# Patient Record
Sex: FEMALE | Race: BLACK OR AFRICAN AMERICAN | Employment: PART TIME | URBAN - METROPOLITAN AREA
[De-identification: names, ages, dates, MRNs, and addresses within clinical notes are randomized per-mention and may not be internally consistent; named-entity substitution may affect disease eponyms.]

---

## 2018-03-16 ENCOUNTER — OFFICE VISIT (OUTPATIENT)
Dept: FAMILY MEDICINE CLINIC | Age: 38
End: 2018-03-16

## 2018-03-16 VITALS
WEIGHT: 270 LBS | HEART RATE: 76 BPM | BODY MASS INDEX: 43.39 KG/M2 | DIASTOLIC BLOOD PRESSURE: 71 MMHG | SYSTOLIC BLOOD PRESSURE: 121 MMHG | OXYGEN SATURATION: 97 % | HEIGHT: 66 IN | TEMPERATURE: 98.6 F | RESPIRATION RATE: 16 BRPM

## 2018-03-16 DIAGNOSIS — F41.9 ANXIETY AND DEPRESSION: Primary | ICD-10-CM

## 2018-03-16 DIAGNOSIS — N91.2 AMENORRHEA: ICD-10-CM

## 2018-03-16 DIAGNOSIS — Z87.898 HISTORY OF PREDIABETES: ICD-10-CM

## 2018-03-16 DIAGNOSIS — F32.A ANXIETY AND DEPRESSION: Primary | ICD-10-CM

## 2018-03-16 DIAGNOSIS — Z00.00 PHYSICAL EXAM, ANNUAL: ICD-10-CM

## 2018-03-16 DIAGNOSIS — Z30.09 ENCOUNTER FOR GENERAL COUNSELING AND ADVICE ON CONTRACEPTIVE MANAGEMENT: ICD-10-CM

## 2018-03-16 PROBLEM — E66.01 OBESITY, MORBID (HCC): Status: ACTIVE | Noted: 2018-03-16

## 2018-03-16 LAB
HBA1C MFR BLD HPLC: 5.9 %
HCG URINE, QL. (POC): NEGATIVE
VALID INTERNAL CONTROL?: YES

## 2018-03-16 RX ORDER — METRONIDAZOLE 500 MG/1
TABLET ORAL
COMMUNITY
Start: 2018-02-05 | End: 2018-03-16 | Stop reason: ALTCHOICE

## 2018-03-16 RX ORDER — NORETHINDRONE 0.35 MG/1
TABLET ORAL
COMMUNITY
Start: 2017-12-21 | End: 2018-03-16

## 2018-03-16 RX ORDER — VENLAFAXINE HYDROCHLORIDE 37.5 MG/1
37.5 CAPSULE, EXTENDED RELEASE ORAL DAILY
Qty: 30 CAP | Refills: 1 | Status: SHIPPED | OUTPATIENT
Start: 2018-03-16 | End: 2018-06-25

## 2018-03-16 NOTE — MR AVS SNAPSHOT
[address] [facility] 
 
 
 [address] [facility] [place] [address] [id number] [phone] Patient: Tip Hawk MRN: [id number] JQT:[id number] Visit Information Date & Time Provider Department Dept. Phone Encounter #  
 3/16/2018 10:30 AM Bhargav Matthew NP [facility] [phone] [id number] Follow-up Instructions Return in about 4 weeks (around 4/13/2018) for CPE and lab/med follow-up. Upcoming Health Maintenance Date Due DTaP/Tdap/Td series (1 - Tdap) 9/4/2001 PAP AKA CERVICAL CYTOLOGY 9/4/2001 Allergies as of 3/16/2018  Review Complete On: 3/16/2018 By: Bhargav Matthew NP No Known Allergies Current Immunizations  Never Reviewed Name Date Influenza Vaccine 10/26/2015 MMR 5/20/1993 Td 7/20/2014 Not reviewed this visit You Were Diagnosed With   
  
 Codes Comments Anxiety and depression    -  Primary ICD-10-CM: F41.8 ICD-9-CM: 300.00, 311 Encounter for general counseling and advice on contraceptive management     ICD-10-CM: Z30.09 
ICD-9-CM: V25.09 Amenorrhea     ICD-10-CM: N91.2 ICD-9-CM: 626.0 History of prediabetes     ICD-10-CM: Z87.898 ICD-9-CM: V12.29 Physical exam, annual     ICD-10-CM: Z00.00 ICD-9-CM: V70.0 Vitals BP Pulse Temp Resp Height(growth percentile) Weight(growth percentile) 121/71 (BP 1 Location: Left arm, BP Patient Position: Sitting) 76 98.6 °F (37 °C) (Oral) 16 5' 6\" (1.676 m) 270 lb (122.5 kg) LMP SpO2 BMI OB Status Smoking Status 02/15/2018 (Exact Date) 97% 43.58 kg/m2 Having regular periods Light Tobacco Smoker BMI and BSA Data Body Mass Index Body Surface Area 43.58 kg/m 2 2.39 m 2 Preferred Pharmacy Pharmacy Name Phone [address] [facility] [address] [phone] Your Updated Medication List  
  
   
 This list is accurate as of 3/16/18 11:41 AM.  Always use your most recent med list.  
  
  
  
  
 venlafaxine-SR 37.5 mg capsule Commonly known as:  EFFEXOR-XR Take 1 Cap by mouth daily. Prescriptions Sent to Pharmacy Refills  
 venlafaxine-SR (EFFEXOR-XR) 37.5 mg capsule 1 Sig: Take 1 Cap by mouth daily. Class: Normal  
 Pharmacy: [facility] [address] [facility], [address] [place] [address] #: [phone] Route: Oral  
  
We Performed the Following AMB POC HEMOGLOBIN A1C [84512 CPT(R)] AMB POC URINE PREGNANCY TEST, VISUAL COLOR COMPARISON [16000 CPT(R)] CBC W/O DIFF [71938 CPT(R)] LIPID PANEL AND CHOL/HDL RATIO [38859 CPT(R)] METABOLIC PANEL, COMPREHENSIVE [10228 CPT(R)] REFERRAL TO GYNECOLOGY [REF30 Custom] REFERRAL TO PSYCHIATRY [REF91 Custom] TSH 3RD GENERATION [97165 CPT(R)] URINALYSIS W/ RFLX MICROSCOPIC [83297 CPT(R)] VITAMIN D, 25 HYDROXY [id number] CPT(R)] Follow-up Instructions Return in about 4 weeks (around 4/13/2018) for CPE and lab/med follow-up. Referral Information Referral ID Referred By Referred To  
  
 [id number], [facility] [address] Phone: [phone] Fax: [phone] Visits Status Start Date End Date 1 New Request 3/16/18 3/16/19 If your referral has a status of pending review or denied, additional information will be sent to support the outcome of this decision. Referral ID Referred By Referred To  
 [id number] Deann Lara MD  
   [address] [facility] [address] [place], [address] [facility] Phone: [phone] Fax: [phone] Visits Status Start Date End Date 1 New Request 3/16/18 3/16/19 If your referral has a status of pending review or denied, additional information will be sent to support the outcome of this decision. Patient Instructions Anxiety Disorder: Care Instructions Your Care Instructions Anxiety is a normal reaction to stress. Difficult situations can cause you to have symptoms such as sweaty palms and a nervous feeling. In an anxiety disorder, the symptoms are far more severe. Constant worry, muscle tension, trouble sleeping, nausea and diarrhea, and other symptoms can make normal daily activities difficult or impossible. These symptoms may occur for no reason, and they can affect your work, school, or social life. Medicines, counseling, and self-care can all help. Follow-up care is a key part of your treatment and safety. Be sure to make and go to all appointments, and call your doctor if you are having problems. It's also a good idea to know your test results and keep a list of the medicines you take. How can you care for yourself at home? · Take medicines exactly as directed. Call your doctor if you think you are having a problem with your medicine. · Go to your counseling sessions and follow-up appointments. · Recognize and accept your anxiety. Then, when you are in a situation that makes you anxious, say to yourself, \"This is not an emergency. I feel uncomfortable, but I am not in danger. I can keep going even if I feel anxious. \" · Be kind to your body: ¨ Relieve tension with exercise or a massage. ¨ Get enough rest. 
¨ Avoid alcohol, caffeine, nicotine, and illegal drugs. They can increase your anxiety level and cause sleep problems. ¨ Learn and do relaxation techniques. See below for more about these techniques. · Engage your mind. Get out and do something you enjoy. Go to a funny movie, or take a walk or hike. Plan your day. Having too much or too little to do can make you anxious. · Keep a record of your symptoms. Discuss your fears with a good friend or family member, or join a support group for people with similar problems. Talking to others sometimes relieves stress. · Get involved in social groups, or volunteer to help others. Being alone sometimes makes things seem worse than they are. · Get at least 30 minutes of exercise on most days of the week to relieve stress. Walking is a good choice. You also may want to do other activities, such as running, swimming, cycling, or playing tennis or team sports. Relaxation techniques Do relaxation exercises 10 to 20 minutes a day. You can play soothing, relaxing music while you do them, if you wish. · Tell others in your house that you are going to do your relaxation exercises. Ask them not to disturb you. · Find a comfortable place, away from all distractions and noise. · Lie down on your back, or sit with your back straight. · Focus on your breathing. Make it slow and steady. · Breathe in through your nose. Breathe out through either your nose or mouth. · Breathe deeply, filling up the area between your navel and your rib cage. Breathe so that your belly goes up and down. · Do not hold your breath. · Breathe like this for 5 to 10 minutes. Notice the feeling of calmness throughout your whole body. As you continue to breathe slowly and deeply, relax by doing the following for another 5 to 10 minutes: · Tighten and relax each muscle group in your body. You can begin at your toes and work your way up to your head. · Imagine your muscle groups relaxing and becoming heavy. · Empty your mind of all thoughts. · Let yourself relax more and more deeply. · Become aware of the state of calmness that surrounds you. · When your relaxation time is over, you can bring yourself back to alertness by moving your fingers and toes and then your hands and feet and then stretching and moving your entire body. Sometimes people fall asleep during relaxation, but they usually wake up shortly afterward.  
· Always give yourself time to return to full alertness before you drive a car or do anything that might cause an accident if you are not fully alert. Never play a relaxation tape while you drive a car. When should you call for help? Call 911 anytime you think you may need emergency care. For example, call if: 
? · You feel you cannot stop from hurting yourself or someone else. ? Keep the numbers for these national suicide hotlines: 1-860-884-TALK (7-920.774.3797) and 4-969-RMQVTMR (2-221.172.2135). If you or someone you know talks about suicide or feeling hopeless, get help right away. ? Watch closely for changes in your health, and be sure to contact your doctor if: 
? · You have anxiety or fear that affects your life. ? · You have symptoms of anxiety that are new or different from those you had before. Where can you learn more? Go to http://daniel-winston.info/. Enter P754 in the search box to learn more about \"Anxiety Disorder: Care Instructions. \" Current as of: May 12, 2017 Content Version: 11.4 © 3875-1395 [employer]. Care instructions adapted under license by [employer] (which disclaims liability or warranty for this information). If you have questions about a medical condition or this instruction, always ask your healthcare professional. [address] any warranty or liability for your use of this information. Introducing [facility]! [facility] introduces TelePharm patient portal. Now you can access parts of your medical record, email your doctor's office, and request medication refills online. 1. In your internet browser, go to [email or web address] 2. Click on the First Time User? Click Here link in the Sign In box. You will see the New Member Sign Up page. 3. Enter your TelePharm Access Code exactly as it appears below. You will not need to use this code after youve completed the sign-up process.  If you do not sign up before the expiration date, you must request a new code. 
 
· [employer] Access Code: [email or web address] Expires: 6/14/2018 11:41 AM 
 
4. Enter the last four digits of your Social Security Number (xxxx) and Date of Birth (mm/dd/yyyy) as indicated and click Submit. You will be taken to the next sign-up page. 5. Create a NanoRacks ID. This will be your NanoRacks login ID and cannot be changed, so think of one that is secure and easy to remember. 6. Create a [employer]Racks password. You can change your password at any time. 7. Enter your Password Reset Question and Answer. This can be used at a later time if you forget your password. 8. Enter your e-mail address. You will receive e-mail notification when new information is available in [address] 9. Click Sign Up. You can now view and download portions of your medical record. 10. Click the Download Summary menu link to download a portable copy of your medical information. If you have questions, please visit the Frequently Asked Questions section of the [employer] website. Remember, [employer] is NOT to be used for urgent needs. For medical emergencies, dial 911. Now available from your iPhone and Android! Please provide this summary of care documentation to your next provider. Your primary care clinician is listed as Stephania Wagner. If you have any questions after today's visit, please call [phone].

## 2018-03-16 NOTE — PATIENT INSTRUCTIONS
Anxiety Disorder: Care Instructions  Your Care Instructions    Anxiety is a normal reaction to stress. Difficult situations can cause you to have symptoms such as sweaty palms and a nervous feeling. In an anxiety disorder, the symptoms are far more severe. Constant worry, muscle tension, trouble sleeping, nausea and diarrhea, and other symptoms can make normal daily activities difficult or impossible. These symptoms may occur for no reason, and they can affect your work, school, or social life. Medicines, counseling, and self-care can all help. Follow-up care is a key part of your treatment and safety. Be sure to make and go to all appointments, and call your doctor if you are having problems. It's also a good idea to know your test results and keep a list of the medicines you take. How can you care for yourself at home? · Take medicines exactly as directed. Call your doctor if you think you are having a problem with your medicine. · Go to your counseling sessions and follow-up appointments. · Recognize and accept your anxiety. Then, when you are in a situation that makes you anxious, say to yourself, \"This is not an emergency. I feel uncomfortable, but I am not in danger. I can keep going even if I feel anxious. \"  · Be kind to your body:  ¨ Relieve tension with exercise or a massage. ¨ Get enough rest.  ¨ Avoid alcohol, caffeine, nicotine, and illegal drugs. They can increase your anxiety level and cause sleep problems. ¨ Learn and do relaxation techniques. See below for more about these techniques. · Engage your mind. Get out and do something you enjoy. Go to a funny movie, or take a walk or hike. Plan your day. Having too much or too little to do can make you anxious. · Keep a record of your symptoms. Discuss your fears with a good friend or family member, or join a support group for people with similar problems. Talking to others sometimes relieves stress.   · Get involved in social groups, or volunteer to help others. Being alone sometimes makes things seem worse than they are. · Get at least 30 minutes of exercise on most days of the week to relieve stress. Walking is a good choice. You also may want to do other activities, such as running, swimming, cycling, or playing tennis or team sports. Relaxation techniques  Do relaxation exercises 10 to 20 minutes a day. You can play soothing, relaxing music while you do them, if you wish. · Tell others in your house that you are going to do your relaxation exercises. Ask them not to disturb you. · Find a comfortable place, away from all distractions and noise. · Lie down on your back, or sit with your back straight. · Focus on your breathing. Make it slow and steady. · Breathe in through your nose. Breathe out through either your nose or mouth. · Breathe deeply, filling up the area between your navel and your rib cage. Breathe so that your belly goes up and down. · Do not hold your breath. · Breathe like this for 5 to 10 minutes. Notice the feeling of calmness throughout your whole body. As you continue to breathe slowly and deeply, relax by doing the following for another 5 to 10 minutes:  · Tighten and relax each muscle group in your body. You can begin at your toes and work your way up to your head. · Imagine your muscle groups relaxing and becoming heavy. · Empty your mind of all thoughts. · Let yourself relax more and more deeply. · Become aware of the state of calmness that surrounds you. · When your relaxation time is over, you can bring yourself back to alertness by moving your fingers and toes and then your hands and feet and then stretching and moving your entire body. Sometimes people fall asleep during relaxation, but they usually wake up shortly afterward. · Always give yourself time to return to full alertness before you drive a car or do anything that might cause an accident if you are not fully alert.  Never play a relaxation tape while you drive a car. When should you call for help? Call 911 anytime you think you may need emergency care. For example, call if:  ? · You feel you cannot stop from hurting yourself or someone else. ? Keep the numbers for these national suicide hotlines: 3-827-180-TALK (3-291.758.4272) and 1-238-QJWGQXK (2-898.787.4322). If you or someone you know talks about suicide or feeling hopeless, get help right away. ? Watch closely for changes in your health, and be sure to contact your doctor if:  ? · You have anxiety or fear that affects your life. ? · You have symptoms of anxiety that are new or different from those you had before. Where can you learn more? Go to http://daniel-winston.info/. Enter P754 in the search box to learn more about \"Anxiety Disorder: Care Instructions. \"  Current as of: May 12, 2017  Content Version: 11.4  © 2820-6386 [employer]wise[employer]. Care instructions adapted under license by [employer] (which disclaims liability or warranty for this information). If you have questions about a medical condition or this instruction, always ask your healthcare professional. [address] any warranty or liability for your use of this information.

## 2018-03-16 NOTE — PROGRESS NOTES
HISTORY OF PRESENT ILLNESS  eRe Bonilla is a 40 y.o. female. HPI  The patient presents today to establish care. Previous PCP was unsure. Last office visit many years ago. Moved here from [place] recently; working on her diploma and wants to go back to school to be a nurse. Has 4 girls, ranging in age from 5 - 14. Medical Problems:  1. Hx borderline diabetes - Was on metformin in the past for this (2016). Stopped taking it. Has not been rechecked for this. 2. Has hx of anxiety/depression, \"and perhaps bipolar disorder? \" Was on medication (thinks it was zoloft), last about many months ago. Has in-home counseling almost daily through [address] [place]. Would like to get to a psychiatrist for further evaluation; saw one in the past. Denies SI/HI. PHQ-9 score of 5 today and KIKO score of 8. Unable to identify triggers for panic attacks/depression. Not exercising. Current Specialists:  1. OB/GYN - [facility]; last visit Feb (?) 2018    Preventative Care  Flu shot: declines  TDaP: 2014, per patient report  Pap smear: Jan/Feb 2018 done with OB/GYN, unsure who  Mammogram: has never had  Colonoscopy: has never had  Denies family hx of early breast or colon CA. Healthy Habits  Patient is exercising 0x per week; just joined the [facility]. Patient endorses poor diet; drinks juices and eats a fair amount of fast food. Smokes 1/4 pack cigarettes daily, down from 1/2 pack daily. Social alcohol use. Denies illicit drug use. Denies hx of IVDU. Sexually active with 4 male partners in last 12 months. Uses nothing for contraception; interested in pregnancy perhaps, though she isn't sure. LMP 2/15/18. Denies concerns for STIs today; last check 1 month ago. Past Medical History:   Diagnosis Date    Depression      History reviewed. No pertinent surgical history.   Family History   Problem Relation Age of Onset    Diabetes Mother     Hypertension Mother     Diabetes Father    [facility] Hypertension Father      Social History     Social History    Marital status: UNKNOWN     Spouse name: N/A    Number of children: N/A    Years of education: N/A     Social History Main Topics    Smoking status: Light Tobacco Smoker     Packs/day: 0.25    Smokeless tobacco: Never Used    Alcohol use Yes      Comment: social    Drug use: No    Sexual activity: Yes     Partners: Male     Birth control/ protection: None     Other Topics Concern    None     Social History Narrative    None     Patient Active Problem List   Diagnosis Code    Ob[facility] morbid ([facility] E66.01    Anxiety and depression F41.8    History of prediabetes Z87.898     Outpatient Encounter Prescriptions as of 3/16/2018   Medication Sig Dispense Refill    venlafaxine-SR (EFFEXOR-XR) 37.5 mg capsule Take 1 Cap by mouth daily. 30 Cap 1    [DISCONTINUED] metroNIDAZOLE (FLAGYL) 500 mg tablet       [DISCONTINUED] RAIN 0.35 mg tab        No facility-administered encounter medications on file as of 3/16/2018. Visit Vitals    /71 (BP 1 Location: Left arm, BP Patient Position: Sitting)    Pulse 76    Temp 98.6 °F (37 °C) (Oral)    Resp 16    Ht 5' 6\" (1.676 m)    Wt 270 lb (122.5 kg)    LMP 02/15/2018 (Exact Date)    SpO2 97%    BMI 43.58 kg/m2         Review of Systems   Constitutional: Negative for chills, fever and malaise/fatigue. HENT: Negative for congestion and sore throat. Eyes: Negative for blurred vision and double vision. Respiratory: Negative for cough and shortness of breath. Cardiovascular: Negative for chest pain, palpitations, orthopnea and leg swelling. Neurological: Negative for dizziness and headaches. Psychiatric/Behavioral: Positive for depression. Negative for hallucinations, substance abuse and suicidal ideas. The patient is nervous/anxious. The patient does not have insomnia. Physical Exam   Constitutional: She is oriented to person, place, and time.  She appears well-developed and well-nourished. No distress. Morbidly obese   HENT:   Head: Normocephalic and atraumatic. Nose: Nose normal.   Mouth/Throat: Oropharynx is clear and moist. No oropharyngeal exudate. Eyes: Pupils are equal, round, and reactive to light. Right eye exhibits no discharge. Left eye exhibits no discharge. Cardiovascular: Normal rate, regular rhythm and normal heart sounds. Pulmonary/Chest: Effort normal and breath sounds normal. No respiratory distress. She has no wheezes. Neurological: She is alert and oriented to person, place, and time. Skin: Skin is warm and dry. She is not diaphoretic. Psychiatric: She has a normal mood and affect. Her behavior is normal. Judgment normal.   Nursing note and vitals reviewed. ASSESSMENT and PLAN    ICD-10-CM ICD-9-CM    1. Anxiety and depression F41.8 300.00 venlafaxine-SR (EFFEXOR-XR) 37.5 mg capsule     311 REFERRAL TO PSYCHIATRY   2. Encounter for general counseling and advice on contraceptive management Z30.09 V25.09 REFERRAL TO GYNECOLOGY   3. Amenorrhea N91.2 626.0 AMB POC URINE PREGNANCY TEST, VISUAL COLOR COMPARISON   4. History of prediabetes Z87.898 V12.29 AMB POC HEMOGLOBIN A1C   5. Physical exam, annual Z00.00 V70.0 LIPID PANEL AND CHOL/HDL RATIO      METABOLIC PANEL, COMPREHENSIVE      TSH 3RD GENERATION      VITAMIN D, 25 HYDROXY      URINALYSIS W/ RFLX MICROSCOPIC      CBC W/O DIFF     The patient's medications, allergies, and history were all updated today. The patient has signed a records release to request records from  OB/GYN. Discussed contraceptive strategies; patient interested in IUD. Referral placed to Dr. Renae Og for evaluation/placement. Encouraged safe sex practices in the meantime (condom use/limiting # of partners). Start effexor 37.5mg once daily for anxiety/depression.  Patient interested in medication for anxiety, but I discussed that without specific triggers, it will be better to start a once-a-day medication to prevent generalized anxiety. Also discussed that if she does have bipolar disorder, the effexor may make it worse; she would need to seek immediate care if that develops, and she agrees. Referral placed to psychiatry for further evaluation, and patient advised to schedule this appt as soon as possible. Encouraged dietary/exercise changes for weight loss. Encouraged total smoking cessation. Will check basic labs today and have patient return in 4 weeks for CPE and lab/med f/u. Follow-up Disposition:  Return in about 4 weeks (around 4/13/2018) for CPE and lab/med follow-up.

## 2018-03-16 NOTE — PROGRESS NOTES
Chief Complaint   Patient presents with   [facility] Establish Care     Patient here to establish care.   Previous PCP: unknown at  unknown  Patient sees the following specialist  Diego signed by patient today yes  Patient Concerns 1) borderline diabetic  2) meds for anxiety

## 2018-03-17 LAB
25(OH)D3+25(OH)D2 SERPL-MCNC: 12 NG/ML (ref 30–100)
ALBUMIN SERPL-MCNC: 4.3 G/DL (ref 3.5–5.5)
ALBUMIN/GLOB SERPL: 1.4 {RATIO} (ref 1.2–2.2)
ALP SERPL-CCNC: 77 IU/L (ref 39–117)
ALT SERPL-CCNC: 14 IU/L (ref 0–32)
APPEARANCE UR: ABNORMAL
AST SERPL-CCNC: 14 IU/L (ref 0–40)
BILIRUB SERPL-MCNC: 0.3 MG/DL (ref 0–1.2)
BILIRUB UR QL STRIP: NEGATIVE
BUN SERPL-MCNC: 11 MG/DL (ref 6–20)
BUN/CREAT SERPL: 16 (ref 9–23)
CALCIUM SERPL-MCNC: 9.1 MG/DL (ref 8.7–10.2)
CHLORIDE SERPL-SCNC: 101 MMOL/L (ref 96–106)
CHOLEST SERPL-MCNC: 197 MG/DL (ref 100–199)
CHOLEST/HDLC SERPL: 3.5 RATIO UNITS (ref 0–4.4)
CO2 SERPL-SCNC: 22 MMOL/L (ref 18–29)
COLOR UR: YELLOW
CREAT SERPL-MCNC: 0.7 MG/DL (ref 0.57–1)
ERYTHROCYTE [DISTWIDTH] IN BLOOD BY AUTOMATED COUNT: 15.7 % (ref 12.3–15.4)
GFR SERPLBLD CREATININE-BSD FMLA CKD-EPI: 111 ML/MIN/1.73
GFR SERPLBLD CREATININE-BSD FMLA CKD-EPI: 128 ML/MIN/1.73
GLOBULIN SER CALC-MCNC: 3 G/DL (ref 1.5–4.5)
GLUCOSE SERPL-MCNC: 114 MG/DL (ref 65–99)
GLUCOSE UR QL: NEGATIVE
HCT VFR BLD AUTO: 36.5 % (ref 34–46.6)
HDLC SERPL-MCNC: 56 MG/DL
HGB BLD-MCNC: 11.4 G/DL (ref 11.1–15.9)
HGB UR QL STRIP: NEGATIVE
INTERPRETATION, 910389: NORMAL
KETONES UR QL STRIP: NEGATIVE
LDLC SERPL CALC-MCNC: 126 MG/DL (ref 0–99)
LEUKOCYTE ESTERASE UR QL STRIP: NEGATIVE
MCH RBC QN AUTO: 23.3 PG (ref 26.6–33)
MCHC RBC AUTO-ENTMCNC: 31.2 G/DL (ref 31.5–35.7)
MCV RBC AUTO: 75 FL (ref 79–97)
MICRO URNS: ABNORMAL
NITRITE UR QL STRIP: NEGATIVE
PH UR STRIP: 5 [PH] (ref 5–7.5)
PLATELET # BLD AUTO: 316 X10E3/UL (ref 150–379)
POTASSIUM SERPL-SCNC: 4.4 MMOL/L (ref 3.5–5.2)
PROT SERPL-MCNC: 7.3 G/DL (ref 6–8.5)
PROT UR QL STRIP: NEGATIVE
RBC # BLD AUTO: 4.89 X10E6/UL (ref 3.77–5.28)
SODIUM SERPL-SCNC: 139 MMOL/L (ref 134–144)
SP GR UR: 1.02 (ref 1–1.03)
TRIGL SERPL-MCNC: 76 MG/DL (ref 0–149)
TSH SERPL DL<=0.005 MIU/L-ACNC: 1.14 UIU/ML (ref 0.45–4.5)
UROBILINOGEN UR STRIP-MCNC: 0.2 MG/DL (ref 0.2–1)
VLDLC SERPL CALC-MCNC: 15 MG/DL (ref 5–40)
WBC # BLD AUTO: 4.2 X10E3/UL (ref 3.4–10.8)

## 2018-03-26 ENCOUNTER — TELEPHONE (OUTPATIENT)
Dept: FAMILY MEDICINE CLINIC | Age: 38
End: 2018-03-26

## 2018-03-26 ENCOUNTER — LAB ONLY (OUTPATIENT)
Dept: FAMILY MEDICINE CLINIC | Age: 38
End: 2018-03-26

## 2018-03-26 DIAGNOSIS — Z11.1 SCREENING-PULMONARY TB: Primary | ICD-10-CM

## 2018-03-26 NOTE — TELEPHONE ENCOUNTER
----- Message from Kyra Thomas sent at 3/26/2018  8:41 AM EDT -----  Regarding: Izaiah/telephone  Pt is returning a call from today in regard to her TB test for today. She can come in today no later then 12:00pm.Pts number is [phone].

## 2018-03-26 NOTE — LETTER
3/26/2018 12:31 PM 
 
Ms. [address] [facility] [address] [id number] To Whom It May Concern MsLuz Elena Weeks was seen in office today for Lab Only for [place] TB Gold collection test.  Results pending. Sincerely, Ismael Madrigal NP

## 2018-03-26 NOTE — TELEPHONE ENCOUNTER
Patient to come in today by 12:00 p.m. For [employer] test for employer. Allison Lilly - Fax number [phone]. Patient aware of appointment.

## 2018-03-29 ENCOUNTER — TELEPHONE (OUTPATIENT)
Dept: FAMILY MEDICINE CLINIC | Age: 38
End: 2018-03-29

## 2018-03-29 LAB
ANNOTATION COMMENT IMP: NORMAL
GAMMA INTERFERON BACKGROUND BLD IA-ACNC: 0.02 IU/ML
M TB IFN-G BLD-IMP: NEGATIVE
M TB IFN-G CD4+ BCKGRND COR BLD-ACNC: 0.01 IU/ML
M TB IFN-G CD4+ T-CELLS BLD-ACNC: 0.03 IU/ML
MITOGEN IGNF BLD-ACNC: 6.91 IU/ML
QUANTIFERON INCUBATION: NORMAL
SERVICE CMNT-IMP: NORMAL

## 2018-03-29 NOTE — TELEPHONE ENCOUNTER
Spoke with patient and reinforced that letter is for psych referral to Dr. Stacia Edwards. Patient verbalized understanding.

## 2018-03-29 NOTE — PROGRESS NOTES
Please let patient know that her TB screening was negative. Please print/provide this to her if needed. Thanks!   CAB

## 2018-03-29 NOTE — TELEPHONE ENCOUNTER
Patient notified of negative results. Results faxed to [facility] per patient request at  [phone] with confirmation and copy mailed to patient for patient's personal records. Patient verbalized understanding.

## 2018-03-29 NOTE — TELEPHONE ENCOUNTER
----- Message from Armin Hughes NP sent at 3/29/2018 10:19 AM EDT -----  Please let patient know that her TB screening was negative. Please print/provide this to her if needed. Thanks!   CAB

## 2018-03-29 NOTE — TELEPHONE ENCOUNTER
----- Message from Thalia Stover sent at 3/28/2018  4:40 PM EDT -----  Regarding: NICOLA Bliss/ Telephone  Contact: [phone]  Pt. requesting to speak with NICOLA Bliss in regards to a letter that was received via mail.

## 2018-04-16 ENCOUNTER — TELEPHONE (OUTPATIENT)
Dept: FAMILY MEDICINE CLINIC | Age: 38
End: 2018-04-16

## 2018-04-16 PROBLEM — Z87.898 HISTORY OF PREDIABETES: Status: RESOLVED | Noted: 2018-03-16 | Resolved: 2018-04-16

## 2018-04-16 PROBLEM — R73.03 PREDIABETES: Status: ACTIVE | Noted: 2018-04-16

## 2018-04-16 PROBLEM — E55.9 VITAMIN D DEFICIENCY: Status: ACTIVE | Noted: 2018-04-16

## 2018-04-16 PROBLEM — E78.00 ELEVATED LDL CHOLESTEROL LEVEL: Status: ACTIVE | Noted: 2018-04-16

## 2018-04-16 NOTE — LETTER
4/16/2018 2:14 PM 
 
Ms. [address] [place] [id number] Dear Ms. Oumar Morales missed you! Please call our office at [phone] and schedule a follow up appointment for your continued care. Sincerely, Fallon Stafford NP

## 2018-04-20 ENCOUNTER — TELEPHONE (OUTPATIENT)
Dept: FAMILY MEDICINE CLINIC | Age: 38
End: 2018-04-20

## 2018-04-20 NOTE — TELEPHONE ENCOUNTER
----- Message from Alexus Velarde sent at 4/20/2018 12:22 PM EDT -----  Regarding: LETITIA Bliss/ Gala  Pt is requesting Vitamin D Rx be sent to [address] on [facility]. Pt contact [phone].

## 2018-04-20 NOTE — TELEPHONE ENCOUNTER
Patient notified that Vit D. 5000 unit is available over the counter. Patient can  at any pharmacy. Patient verbalized understanding.

## 2018-05-23 ENCOUNTER — HOSPITAL ENCOUNTER (EMERGENCY)
Age: 38
Discharge: HOME | End: 2018-05-23
Attending: EMERGENCY MEDICINE
Payer: COMMERCIAL

## 2018-05-23 ENCOUNTER — APPOINTMENT (OUTPATIENT)
Dept: GENERAL RADIOLOGY | Age: 38
End: 2018-05-23
Attending: EMERGENCY MEDICINE
Payer: COMMERCIAL

## 2018-05-23 VITALS
RESPIRATION RATE: 20 BRPM | SYSTOLIC BLOOD PRESSURE: 150 MMHG | HEIGHT: 66 IN | HEART RATE: 85 BPM | DIASTOLIC BLOOD PRESSURE: 105 MMHG | OXYGEN SATURATION: 99 % | TEMPERATURE: 99.6 F | WEIGHT: 266.98 LBS | BODY MASS INDEX: 42.91 KG/M2

## 2018-05-23 DIAGNOSIS — M25.512 ACUTE PAIN OF LEFT SHOULDER: ICD-10-CM

## 2018-05-23 DIAGNOSIS — M54.12 CERVICAL RADICULOPATHY: ICD-10-CM

## 2018-05-23 DIAGNOSIS — N30.01 ACUTE CYSTITIS WITH HEMATURIA: ICD-10-CM

## 2018-05-23 DIAGNOSIS — R07.81 PLEURITIC CHEST PAIN: Primary | ICD-10-CM

## 2018-05-23 DIAGNOSIS — M79.652 LEFT THIGH PAIN: ICD-10-CM

## 2018-05-23 LAB
ALBUMIN SERPL-MCNC: 3.6 G/DL (ref 3.5–5)
ALBUMIN/GLOB SERPL: 0.9 {RATIO} (ref 1.1–2.2)
ALP SERPL-CCNC: 93 U/L (ref 45–117)
ALT SERPL-CCNC: 27 U/L (ref 12–78)
ANION GAP SERPL CALC-SCNC: 6 MMOL/L (ref 5–15)
APPEARANCE UR: ABNORMAL
AST SERPL-CCNC: 18 U/L (ref 15–37)
ATRIAL RATE: 94 BPM
BACTERIA URNS QL MICRO: ABNORMAL /HPF
BASOPHILS # BLD: 0 K/UL (ref 0–0.1)
BASOPHILS NFR BLD: 0 % (ref 0–1)
BILIRUB SERPL-MCNC: 0.3 MG/DL (ref 0.2–1)
BILIRUB UR QL: NEGATIVE
BUN SERPL-MCNC: 8 MG/DL (ref 6–20)
BUN/CREAT SERPL: 11 (ref 12–20)
CALCIUM SERPL-MCNC: 9.1 MG/DL (ref 8.5–10.1)
CALCULATED P AXIS, ECG09: 16 DEGREES
CALCULATED R AXIS, ECG10: -19 DEGREES
CALCULATED T AXIS, ECG11: -12 DEGREES
CHLORIDE SERPL-SCNC: 102 MMOL/L (ref 97–108)
CK MB CFR SERPL CALC: NORMAL % (ref 0–2.5)
CK MB SERPL-MCNC: <1 NG/ML (ref 5–25)
CK SERPL-CCNC: 54 U/L (ref 26–192)
CO2 SERPL-SCNC: 28 MMOL/L (ref 21–32)
COLOR UR: ABNORMAL
CREAT SERPL-MCNC: 0.74 MG/DL (ref 0.55–1.02)
D DIMER PPP FEU-MCNC: 0.39 MG/L FEU (ref 0–0.65)
DIAGNOSIS, 93000: NORMAL
DIFFERENTIAL METHOD BLD: ABNORMAL
EOSINOPHIL # BLD: 0.1 K/UL (ref 0–0.4)
EOSINOPHIL NFR BLD: 1 % (ref 0–7)
EPITH CASTS URNS QL MICRO: ABNORMAL /LPF
ERYTHROCYTE [DISTWIDTH] IN BLOOD BY AUTOMATED COUNT: 13.7 % (ref 11.5–14.5)
GLOBULIN SER CALC-MCNC: 4.2 G/DL (ref 2–4)
GLUCOSE SERPL-MCNC: 96 MG/DL (ref 65–100)
GLUCOSE UR STRIP.AUTO-MCNC: NEGATIVE MG/DL
HCT VFR BLD AUTO: 37.5 % (ref 35–47)
HGB BLD-MCNC: 11.6 G/DL (ref 11.5–16)
HGB UR QL STRIP: ABNORMAL
HYALINE CASTS URNS QL MICRO: ABNORMAL /LPF (ref 0–5)
IMM GRANULOCYTES # BLD: 0 K/UL (ref 0–0.04)
IMM GRANULOCYTES NFR BLD AUTO: 0 % (ref 0–0.5)
KETONES UR QL STRIP.AUTO: NEGATIVE MG/DL
LEUKOCYTE ESTERASE UR QL STRIP.AUTO: ABNORMAL
LIPASE SERPL-CCNC: 101 U/L (ref 73–393)
LYMPHOCYTES # BLD: 1.9 K/UL (ref 0.8–3.5)
LYMPHOCYTES NFR BLD: 28 % (ref 12–49)
MCH RBC QN AUTO: 23.2 PG (ref 26–34)
MCHC RBC AUTO-ENTMCNC: 30.9 G/DL (ref 30–36.5)
MCV RBC AUTO: 75.2 FL (ref 80–99)
MONOCYTES # BLD: 0.6 K/UL (ref 0–1)
MONOCYTES NFR BLD: 9 % (ref 5–13)
NEUTS SEG # BLD: 4.1 K/UL (ref 1.8–8)
NEUTS SEG NFR BLD: 62 % (ref 32–75)
NITRITE UR QL STRIP.AUTO: NEGATIVE
NRBC # BLD: 0 K/UL (ref 0–0.01)
NRBC BLD-RTO: 0 PER 100 WBC
P-R INTERVAL, ECG05: 144 MS
PH UR STRIP: 5.5 [PH] (ref 5–8)
PLATELET # BLD AUTO: 278 K/UL (ref 150–400)
PMV BLD AUTO: 10.4 FL (ref 8.9–12.9)
POTASSIUM SERPL-SCNC: 3.9 MMOL/L (ref 3.5–5.1)
PROT SERPL-MCNC: 7.8 G/DL (ref 6.4–8.2)
PROT UR STRIP-MCNC: NEGATIVE MG/DL
Q-T INTERVAL, ECG07: 334 MS
QRS DURATION, ECG06: 78 MS
QTC CALCULATION (BEZET), ECG08: 417 MS
RBC # BLD AUTO: 4.99 M/UL (ref 3.8–5.2)
RBC #/AREA URNS HPF: ABNORMAL /HPF (ref 0–5)
RBC MORPH BLD: ABNORMAL
SODIUM SERPL-SCNC: 136 MMOL/L (ref 136–145)
SP GR UR REFRACTOMETRY: 1.02 (ref 1–1.03)
TROPONIN I SERPL-MCNC: <0.04 NG/ML
UA: UC IF INDICATED,UAUC: ABNORMAL
UROBILINOGEN UR QL STRIP.AUTO: 0.2 EU/DL (ref 0.2–1)
VENTRICULAR RATE, ECG03: 94 BPM
WBC # BLD AUTO: 6.7 K/UL (ref 3.6–11)
WBC URNS QL MICRO: ABNORMAL /HPF (ref 0–4)

## 2018-05-23 PROCEDURE — 85025 COMPLETE CBC W/AUTO DIFF WBC: CPT | Performed by: EMERGENCY MEDICINE

## 2018-05-23 PROCEDURE — 87086 URINE CULTURE/COLONY COUNT: CPT | Performed by: EMERGENCY MEDICINE

## 2018-05-23 PROCEDURE — 96375 TX/PRO/DX INJ NEW DRUG ADDON: CPT

## 2018-05-23 PROCEDURE — 36415 COLL VENOUS BLD VENIPUNCTURE: CPT | Performed by: EMERGENCY MEDICINE

## 2018-05-23 PROCEDURE — 74011250636 HC RX REV CODE- 250/636: Performed by: EMERGENCY MEDICINE

## 2018-05-23 PROCEDURE — 93005 ELECTROCARDIOGRAM TRACING: CPT

## 2018-05-23 PROCEDURE — 84484 ASSAY OF TROPONIN QUANT: CPT | Performed by: EMERGENCY MEDICINE

## 2018-05-23 PROCEDURE — 81001 URINALYSIS AUTO W/SCOPE: CPT | Performed by: EMERGENCY MEDICINE

## 2018-05-23 PROCEDURE — 83690 ASSAY OF LIPASE: CPT | Performed by: EMERGENCY MEDICINE

## 2018-05-23 PROCEDURE — 71045 X-RAY EXAM CHEST 1 VIEW: CPT

## 2018-05-23 PROCEDURE — 96374 THER/PROPH/DIAG INJ IV PUSH: CPT

## 2018-05-23 PROCEDURE — 99283 EMERGENCY DEPT VISIT LOW MDM: CPT

## 2018-05-23 PROCEDURE — 85379 FIBRIN DEGRADATION QUANT: CPT | Performed by: EMERGENCY MEDICINE

## 2018-05-23 PROCEDURE — 80053 COMPREHEN METABOLIC PANEL: CPT | Performed by: EMERGENCY MEDICINE

## 2018-05-23 PROCEDURE — 82553 CREATINE MB FRACTION: CPT | Performed by: EMERGENCY MEDICINE

## 2018-05-23 RX ORDER — PROMETHAZINE HYDROCHLORIDE 25 MG/1
25 TABLET ORAL
Qty: 12 TAB | Refills: 0 | Status: SHIPPED | OUTPATIENT
Start: 2018-05-23 | End: 2018-06-25

## 2018-05-23 RX ORDER — ONDANSETRON 2 MG/ML
4 INJECTION INTRAMUSCULAR; INTRAVENOUS
Status: COMPLETED | OUTPATIENT
Start: 2018-05-23 | End: 2018-05-23

## 2018-05-23 RX ORDER — HYDROCODONE BITARTRATE AND ACETAMINOPHEN 5; 325 MG/1; MG/1
1 TABLET ORAL
Qty: 20 TAB | Refills: 0 | Status: SHIPPED | OUTPATIENT
Start: 2018-05-23 | End: 2018-06-25

## 2018-05-23 RX ORDER — FENTANYL CITRATE 50 UG/ML
100 INJECTION, SOLUTION INTRAMUSCULAR; INTRAVENOUS
Status: COMPLETED | OUTPATIENT
Start: 2018-05-23 | End: 2018-05-23

## 2018-05-23 RX ORDER — CEPHALEXIN 500 MG/1
500 CAPSULE ORAL 3 TIMES DAILY
Qty: 15 CAP | Refills: 0 | Status: SHIPPED | OUTPATIENT
Start: 2018-05-23 | End: 2018-06-25 | Stop reason: ALTCHOICE

## 2018-05-23 RX ADMIN — FENTANYL CITRATE 100 MCG: 50 INJECTION, SOLUTION INTRAMUSCULAR; INTRAVENOUS at 15:37

## 2018-05-23 RX ADMIN — ONDANSETRON 4 MG: 2 INJECTION INTRAMUSCULAR; INTRAVENOUS at 15:35

## 2018-05-23 NOTE — ED NOTES
Discharge instructions reviewed with patient. Discharge instructions given to patient per Dr. Kevin Elaine. Patient able to return/verbalize discharge instructions. Copy of discharge instructions provided. Patient condition stable, respiratory status within normal limits, neuro status intact. Ambulatory out of ER, accompanied by family.

## 2018-05-23 NOTE — LETTER
[address] 
[facility] EMERGENCY DEPT 
[address] [place] [address] [facility] [place] [address] 
[phone] Work/School Note Date: 5/23/2018 To Whom It May concern: 
 
Bibiana Nowak was seen and treated today in the emergency room by the following provider(s): 
Attending Provider: Jean Claude Tavares MD.   
 
Bibiana Nowak may return to work on 05/24/2018. Sincerely, Jean Claude Tavares MD

## 2018-05-23 NOTE — LETTER
[address] 
[facility] EMERGENCY DEPT 
[address] [place] [address] [address] [phone] 
[phone] Work/School Note Date: 5/23/2018 To Whom It May concern: 
 
Michel Cote was seen and treated today in the emergency room by the following provider(s): 
Attending Provider: Norbert Rios MD.   
 
Michel Cote may return to work on 05/25/2018. Sincerely, Norbert Rios MD

## 2018-05-23 NOTE — ED NOTES
Bedside and Verbal shift change received from Giancarlo Harden RN. Report included the following information: SBAR, ED Summary, MAR and Recent Results. Patient requesting pain medication from multiple staff members. Waiting for IV access.

## 2018-05-23 NOTE — ED PROVIDER NOTES
EMERGENCY DEPARTMENT HISTORY AND PHYSICAL EXAM      Date: 5/23/2018  Patient Name: Rea Du    History of Presenting Illness     Chief Complaint   Patient presents with    Neck Pain     She is having pain in the left side of her neck down to her shoulder and left breast area for about 3 days. She had a miscarriage (5 weeks) about 2 weeks ago. She also was seen for a viral infection about 2 weeks ago.  Shoulder Pain    Shortness of Breath       History Provided By: Patient    HPI: Rea Du, 40 y.o. female with a pertinent PMHx significant for herniated disc, presents ambulatory to the ED with cc of left-sided neck and shoulder pain x 3 days. Pt endorses associated SOB, left lateral rib pain, and left leg pain. Pt notes that her pain is exacerbated with movement. Pt states that she was having a HA and nausea, but these sxs have since resolved. Pt reports that she has been taking Ibuprofen with no relief of her sxs. Pt explains that her sxs began 3 days ago with pain in her left lateral rib that has since spread to her left leg, left neck, and left shoulder. Pt reports that she has a Hx of a herniated disc which her Orthopedist recommended that she have surgery to fix, but she notes that she did not follow through with the surgery. Pt makes note that she has a miscarriage on 5/19/18. Pt denies any Hx of DVT or PE, nor any significant FHx. Pt denies any recent long travel or surgeries. Pt endorses a of former tobacco and EtOH use. Pt specifically denies vaginal discharge, vaginal bleeding, leg swelling, cough, fever, or chest pain. There are no other complaints, changes, or physical findings at this time. PCP: Zion Sanz NP    Current Outpatient Prescriptions   Medication Sig Dispense Refill    HYDROcodone-acetaminophen (NORCO) 5-325 mg per tablet Take 1 Tab by mouth every four (4) hours as needed for Pain. Max Daily Amount: 6 Tabs.  20 Tab 0    cephALEXin (KEFLEX) 500 mg capsule Take 1 Cap by mouth three (3) times daily. 15 Cap 0    promethazine (PHENERGAN) 25 mg tablet Take 1 Tab by mouth every six (6) hours as needed for Nausea. 12 Tab 0    venlafaxine-SR (EFFEXOR-XR) 37.5 mg capsule Take 1 Cap by mouth daily. 30 Cap 1       Past History     Past Medical History:  Past Medical History:   Diagnosis Date    Depression        Past Surgical History:  History reviewed. No pertinent surgical history. Family History:  Family History   Problem Relation Age of Onset    Diabetes Mother     Hypertension Mother    Marco A Sena Diabetes Father     Hypertension Father        Social History:  Social History   Substance Use Topics    Smoking status: Light Tobacco Smoker     Packs/day: 0.25    Smokeless tobacco: Never Used    Alcohol use Yes      Comment: social       Allergies:  No Known Allergies      Review of Systems   Review of Systems   Constitutional: Negative for fever. HENT: Negative for congestion. Eyes: Negative for visual disturbance. Respiratory: Positive for shortness of breath. Gastrointestinal: Negative for abdominal pain. Endocrine: Negative for heat intolerance. Genitourinary: Negative. Musculoskeletal: Positive for arthralgias, myalgias and neck pain. Skin: Negative for rash. Allergic/Immunologic: Negative for immunocompromised state. Neurological: Negative for dizziness. Hematological: Does not bruise/bleed easily. Psychiatric/Behavioral: Negative. All other systems reviewed and are negative. Physical Exam   Physical Exam   Constitutional: She is oriented to person, place, and time. She appears well-developed and well-nourished. She appears distressed (Moderate). HENT:   Head: Normocephalic and atraumatic. Eyes: EOM are normal. Pupils are equal, round, and reactive to light. Neck: Normal range of motion. Neck supple. Cardiovascular: Normal rate, regular rhythm and normal heart sounds.     Intact DP pulses   Pulmonary/Chest: Effort normal and breath sounds normal. No respiratory distress. Lungs are clear  Left lateral rib tenderness   Abdominal: Soft. Bowel sounds are normal. She exhibits no mass. Left flank tenderness   Musculoskeletal: Normal range of motion. She exhibits no edema. Left shoulder tenderness  Left thigh tenderness, no edema   Neurological: She is alert and oriented to person, place, and time. Coordination normal.   Skin: Skin is warm and dry. Psychiatric: She has a normal mood and affect. Her behavior is normal.   Nursing note and vitals reviewed.         Diagnostic Study Results     Labs -     Recent Results (from the past 12 hour(s))   URINALYSIS W/ REFLEX CULTURE    Collection Time: 05/23/18  1:06 PM   Result Value Ref Range    Color YELLOW/STRAW      Appearance CLOUDY (A) CLEAR      Specific gravity 1.019 1.003 - 1.030      pH (UA) 5.5 5.0 - 8.0      Protein NEGATIVE  NEG mg/dL    Glucose NEGATIVE  NEG mg/dL    Ketone NEGATIVE  NEG mg/dL    Bilirubin NEGATIVE  NEG      Blood LARGE (A) NEG      Urobilinogen 0.2 0.2 - 1.0 EU/dL    Nitrites NEGATIVE  NEG      Leukocyte Esterase SMALL (A) NEG      WBC 10-20 0 - 4 /hpf    RBC 0-5 0 - 5 /hpf    Epithelial cells MANY (A) FEW /lpf    Bacteria 2+ (A) NEG /hpf    UA:UC IF INDICATED URINE CULTURE ORDERED (A) CNI      Hyaline cast 2-5 0 - 5 /lpf   EKG, 12 LEAD, INITIAL    Collection Time: 05/23/18  1:53 PM   Result Value Ref Range    Ventricular Rate 94 BPM    Atrial Rate 94 BPM    P-R Interval 144 ms    QRS Duration 78 ms    Q-T Interval 334 ms    QTC Calculation (Bezet) 417 ms    Calculated P Axis 16 degrees    Calculated R Axis -19 degrees    Calculated T Axis -12 degrees    Diagnosis       Normal sinus rhythm  Minimal voltage criteria for LVH, may be normal variant  Possible Anterior infarct , age undetermined  Abnormal ECG  No previous ECGs available     CBC WITH AUTOMATED DIFF    Collection Time: 05/23/18  3:15 PM   Result Value Ref Range    WBC 6.7 3.6 - 11.0 K/uL    RBC 4.99 3.80 - 5.20 M/uL    HGB 11.6 11.5 - 16.0 g/dL    HCT 37.5 35.0 - 47.0 %    MCV 75.2 (L) 80.0 - 99.0 FL    MCH 23.2 (L) 26.0 - 34.0 PG    MCHC 30.9 30.0 - 36.5 g/dL    RDW 13.7 11.5 - 14.5 %    PLATELET 338 234 - 352 K/uL    MPV 10.4 8.9 - 12.9 FL    NRBC 0.0 0  WBC    ABSOLUTE NRBC 0.00 0.00 - 0.01 K/uL    NEUTROPHILS 62 32 - 75 %    LYMPHOCYTES 28 12 - 49 %    MONOCYTES 9 5 - 13 %    EOSINOPHILS 1 0 - 7 %    BASOPHILS 0 0 - 1 %    IMMATURE GRANULOCYTES 0 0.0 - 0.5 %    ABS. NEUTROPHILS 4.1 1.8 - 8.0 K/UL    ABS. LYMPHOCYTES 1.9 0.8 - 3.5 K/UL    ABS. MONOCYTES 0.6 0.0 - 1.0 K/UL    ABS. EOSINOPHILS 0.1 0.0 - 0.4 K/UL    ABS. BASOPHILS 0.0 0.0 - 0.1 K/UL    ABS. IMM. GRANS. 0.0 0.00 - 0.04 K/UL    DF AUTOMATED      RBC COMMENTS NORMOCYTIC, NORMOCHROMIC     METABOLIC PANEL, COMPREHENSIVE    Collection Time: 05/23/18  3:15 PM   Result Value Ref Range    Sodium 136 136 - 145 mmol/L    Potassium 3.9 3.5 - 5.1 mmol/L    Chloride 102 97 - 108 mmol/L    CO2 28 21 - 32 mmol/L    Anion gap 6 5 - 15 mmol/L    Glucose 96 65 - 100 mg/dL    BUN 8 6 - 20 MG/DL    Creatinine 0.74 0.55 - 1.02 MG/DL    BUN/Creatinine ratio 11 (L) 12 - 20      GFR est AA >60 >60 ml/min/1.73m2    GFR est non-AA >60 >60 ml/min/1.73m2    Calcium 9.1 8.5 - 10.1 MG/DL    Bilirubin, total 0.3 0.2 - 1.0 MG/DL    ALT (SGPT) 27 12 - 78 U/L    AST (SGOT) 18 15 - 37 U/L    Alk.  phosphatase 93 45 - 117 U/L    Protein, total 7.8 6.4 - 8.2 g/dL    Albumin 3.6 3.5 - 5.0 g/dL    Globulin 4.2 (H) 2.0 - 4.0 g/dL    A-G Ratio 0.9 (L) 1.1 - 2.2     LIPASE    Collection Time: 05/23/18  3:15 PM   Result Value Ref Range    Lipase 101 73 - 393 U/L   CK W/ CKMB & INDEX    Collection Time: 05/23/18  3:15 PM   Result Value Ref Range    CK 54 26 - 192 U/L    CK - MB <1.0 <3.6 NG/ML    CK-MB Index Cannot be calculated 0 - 2.5     TROPONIN I    Collection Time: 05/23/18  3:15 PM   Result Value Ref Range    Troponin-I, Qt. <0.04 <0.05 ng/mL   D DIMER    Collection Time: 05/23/18  3:15 PM   Result Value Ref Range    D-dimer 0.39 0.00 - 0.65 mg/L FEU       Radiologic Studies -     CXR Results  (Last 48 hours)               05/23/18 1518  XR CHEST PORT Final result    Impression:  Impression:   1. Low lung volumes with bibasilar atelectasis left greater than right           Narrative:  INDICATION:  Chest Pain        Exam: Portable chest 1516. Comparison: None. Findings: Cardiomediastinal silhouette is within normal limits. Pulmonary   vasculature is not engorged. Lung volumes are decreased with bibasilar   atelectasis left greater than right. No pneumothorax or effusion                   Medical Decision Making   I am the first provider for this patient. I reviewed the vital signs, available nursing notes, past medical history, past surgical history, family history and social history. Vital Signs-Reviewed the patient's vital signs. Patient Vitals for the past 12 hrs:   Temp Pulse Resp BP SpO2   05/23/18 1248 99.6 °F (37.6 °C) 85 20 (!) 150/105 99 %       Pulse Oximetry Analysis - 99% on RA    Cardiac Monitor:   Rate: 84 bpm  Rhythm: Normal Sinus Rhythm      EKG interpretation: (Preliminary) 12:53  Rhythm: normal sinus rhythm; and regular . Rate (approx.): 84 bpm; Axis: normal; IA interval: normal; QRS interval: normal ; ST/T wave: normal; Other findings: no prior EKGs. Written by NICKOLAS Mejia, as dictated by Yoselyn Cassidy MD.    EKG interpretation: (Secondary) 13:53  Rhythm: normal sinus rhythm; and regular . Rate (approx.): 94 bpm; Axis: normal; IA interval: normal; QRS interval: normal ; ST/T wave: non-specific ST abnormalities.   Written by NICKOLAS Mejia, as dictated by Yoselyn Cassidy MD.    Records Reviewed: Nursing Notes, Old Medical Records, Previous electrocardiograms, Previous Radiology Studies and Previous Laboratory Studies    Provider Notes (Medical Decision Making):   DDx: musculoskeletal pain, PE, anxiety, radiculopathy, CAD, UTI, DVT    ED Course:   Initial assessment performed. The patients presenting problems have been discussed, and they are in agreement with the care plan formulated and outlined with them. I have encouraged them to ask questions as they arise throughout their visit. PROGRESS NOTE:  4:50 PM  Pt reports that she is currently feeling better, and she is stable for discharge. Written by Jesica Soto, ED Scribe, as dictated by Snow Ortiz MD.    Critical Care Time:   0    Disposition:  5:00 PM  The patient has been re-evaluated and is ready for discharge. Reviewed available results with patient. Counseled patient on diagnosis and care plan. Patient has expressed understanding, and all questions have been answered. Patient agrees with plan and agrees to follow up as recommended, or return to the ED if their symptoms worsen. Discharge instructions have been provided and explained to the patient, along with reasons to return to the ED. PLAN:  1. Current Discharge Medication List      START taking these medications    Details   HYDROcodone-acetaminophen (NORCO) 5-325 mg per tablet Take 1 Tab by mouth every four (4) hours as needed for Pain. Max Daily Amount: 6 Tabs. Qty: 20 Tab, Refills: 0    Associated Diagnoses: Pleuritic chest pain; Acute pain of left shoulder      cephALEXin (KEFLEX) 500 mg capsule Take 1 Cap by mouth three (3) times daily. Qty: 15 Cap, Refills: 0      promethazine (PHENERGAN) 25 mg tablet Take 1 Tab by mouth every six (6) hours as needed for Nausea. Qty: 12 Tab, Refills: 0           2.    Follow-up Information     Follow up With Details Comments Contact Info    Kaylyn Gilliam NP In 2 days As needed [address] [place] Wei Sebastian MD  As needed [id number] [facility]  [phone]      [facility] EMERGENCY DEPT  If symptoms worsen [address] [facility]  [address] [facility]  [phone]        Return to ED if worse Diagnosis     Clinical Impression:   1. Pleuritic chest pain    2. Acute pain of left shoulder    3. Cervical radiculopathy    4. Left thigh pain    5. Acute cystitis with hematuria        Attestations: This note is prepared by Garo Wan, acting as Scribe for Odilia Szymanski MD.    Odilia Szymanski MD: The scribe's documentation has been prepared under my direction and personally reviewed by me in its entirety. I confirm that the note above accurately reflects all work, treatment, procedures, and medical decision making performed by me.

## 2018-05-23 NOTE — DISCHARGE INSTRUCTIONS
Pinched Nerve in the Neck: Care Instructions  Your Care Instructions  A pinched nerve in the neck happens when a vertebra or disc in the upper part of your spine is damaged. This damage can happen because of an injury. Or it can just happen with age. The changes caused by the damage may put pressure on a nearby nerve root, pinching it. This causes symptoms such as sharp pain in your neck, shoulder, arm, hand, or back. You may also have tingling or numbness. Sometimes it makes your arm weaker. The symptoms are usually worse when you turn your head or strain your neck. For many people, the symptoms get better over time and finally go away. Early treatment usually includes medicines for pain and swelling. Sometimes physical therapy and special exercises may help. Follow-up care is a key part of your treatment and safety. Be sure to make and go to all appointments, and call your doctor if you are having problems. It's also a good idea to know your test results and keep a list of the medicines you take. How can you care for yourself at home? · Be safe with medicines. Read and follow all instructions on the label. ¨ If the doctor gave you a prescription medicine for pain, take it as prescribed. ¨ If you are not taking a prescription pain medicine, ask your doctor if you can take an over-the-counter medicine. · Try using a heating pad on a low or medium setting for 15 to 20 minutes every 2 or 3 hours. Try a warm shower in place of one session with the heating pad. You can also buy single-use heat wraps that last up to 8 hours. · You can also try an ice pack for 10 to 15 minutes every 2 to 3 hours. There isn't strong evidence that either heat or ice will help. But you can try them to see if they help you. · Don't spend too long in one position. Take short breaks to move around and change positions. · Wear a seat belt and shoulder harness when you are in a car.   · Sleep with a pillow under your head and neck that keeps your neck straight. · If you were given a neck brace (cervical collar) to limit neck motion, wear it as instructed for as many days as your doctor tells you to. Do not wear it longer than you were told to. Wearing a brace for too long can lead to neck stiffness and can weaken the neck muscles. · Follow your doctor's instructions for gentle neck-stretching exercises. · Do not smoke. Smoking can slow healing of your discs. If you need help quitting, talk to your doctor about stop-smoking programs and medicines. These can increase your chances of quitting for good. · Avoid strenuous work or exercise until your doctor says it is okay. When should you call for help? Call 911 anytime you think you may need emergency care. For example, call if:  ? · You are unable to move an arm or a leg at all. ?Call your doctor now or seek immediate medical care if:  ? · You have new or worse symptoms in your arms, legs, chest, belly, or buttocks. Symptoms may include:  ¨ Numbness or tingling. ¨ Weakness. ¨ Pain. ? · You lose bladder or bowel control. ? Watch closely for changes in your health, and be sure to contact your doctor if:  ? · You are not getting better as expected. Where can you learn more? Go to http://daniel-winston.info/. Enter I437 in the search box to learn more about \"Pinched Nerve in the Neck: Care Instructions. \"  Current as of: March 21, 2017  Content Version: 11.5  © 1280-9224 [employer]. Care instructions adapted under license by [employer] (which disclaims liability or warranty for this information). If you have questions about a medical condition or this instruction, always ask your healthcare professional. [place] [address] any warranty or liability for your use of this information. Chest Pain: Care Instructions  Your Care Instructions    There are many things that can cause chest pain.  Some are not serious and will get better on their own in a few days. But some kinds of chest pain need more testing and treatment. Your doctor may have recommended a follow-up visit in the next 8 to 12 hours. If you are not getting better, you may need more tests or treatment. Even though your doctor has released you, you still need to watch for any problems. The doctor carefully checked you, but sometimes problems can develop later. If you have new symptoms or if your symptoms do not get better, get medical care right away. If you have worse or different chest pain or pressure that lasts more than 5 minutes or you passed out (lost consciousness), call 911 or seek other emergency help right away. A medical visit is only one step in your treatment. Even if you feel better, you still need to do what your doctor recommends, such as going to all suggested follow-up appointments and taking medicines exactly as directed. This will help you recover and help prevent future problems. How can you care for yourself at home? · Rest until you feel better. · Take your medicine exactly as prescribed. Call your doctor if you think you are having a problem with your medicine. · Do not drive after taking a prescription pain medicine. When should you call for help? Call 911 if:  ? · You passed out (lost consciousness). ? · You have severe difficulty breathing. ? · You have symptoms of a heart attack. These may include:  ¨ Chest pain or pressure, or a strange feeling in your chest.  ¨ Sweating. ¨ Shortness of breath. ¨ Nausea or vomiting. ¨ Pain, pressure, or a strange feeling in your back, neck, jaw, or upper belly or in one or both shoulders or arms. ¨ Lightheadedness or sudden weakness. ¨ A fast or irregular heartbeat. After you call 911, the  may tell you to chew 1 adult-strength or 2 to 4 low-dose aspirin. Wait for an ambulance. Do not try to drive yourself. ?Call your doctor today if:  ? · You have any trouble breathing.    ? · Your chest pain gets worse. ? · You are dizzy or lightheaded, or you feel like you may faint. ? · You are not getting better as expected. ? · You are having new or different chest pain. Where can you learn more? Go to http://daniel-winston.info/. Enter A120 in the search box to learn more about \"Chest Pain: Care Instructions. \"  Current as of: March 20, 2017  Content Version: 11.4  © 8880-0444 [employer]. Care instructions adapted under license by [employer] (which disclaims liability or warranty for this information). If you have questions about a medical condition or this instruction, always ask your healthcare professional. [place] [address] any warranty or liability for your use of this information. Musculoskeletal Pain: Care Instructions  Your Care Instructions    Different problems with the bones, muscles, nerves, ligaments, and tendons in the body can cause pain. One or more areas of your body may ache or burn. Or they may feel tired, stiff, or sore. The medical term for this type of pain is musculoskeletal pain. It can have many different causes. Sometimes the pain is caused by an injury such as a strain or sprain. Or you might have pain from using one part of your body in the same way over and over again. This is called overuse. In some cases, the cause of the pain is another health problem such as arthritis or fibromyalgia. The doctor will examine you and ask you questions about your health to help find the cause of your pain. Blood tests or imaging tests like an X-ray may also be helpful. But sometimes doctors can't find a cause of the pain. Treatment depends on your symptoms and the cause of the pain, if known. The doctor has checked you carefully, but problems can develop later. If you notice any problems or new symptoms, get medical treatment right away. Follow-up care is a key part of your treatment and safety.  Be sure to make and go to all appointments, and call your doctor if you are having problems. It's also a good idea to know your test results and keep a list of the medicines you take. How can you care for yourself at home? · Rest until you feel better. · Do not do anything that makes the pain worse. Return to exercise gradually if you feel better and your doctor says it's okay. · Be safe with medicines. Read and follow all instructions on the label. ¨ If the doctor gave you a prescription medicine for pain, take it as prescribed. ¨ If you are not taking a prescription pain medicine, ask your doctor if you can take an over-the-counter medicine. · Put ice or a cold pack on the area for 10 to 20 minutes at a time to ease pain. Put a thin cloth between the ice and your skin. When should you call for help? Call your doctor now or seek immediate medical care if:  ? · You have new pain, or your pain gets worse. ? · You have new symptoms such as a fever, a rash, or chills. ? Watch closely for changes in your health, and be sure to contact your doctor if:  ? · You do not get better as expected. Where can you learn more? Go to http://daniel-winston.info/. Enter K331 in the search box to learn more about \"Musculoskeletal Pain: Care Instructions. \"  Current as of: October 14, 2016  Content Version: 11.4  © 0973-4648 [employer]. Care instructions adapted under license by [employer] (which disclaims liability or warranty for this information). If you have questions about a medical condition or this instruction, always ask your healthcare professional. [place] [address] any warranty or liability for your use of this information. Shoulder Pain: Care Instructions  Your Care Instructions    You can hurt your shoulder by using it too much during an activity, such as fishing or baseball.  It can also happen as part of the everyday wear and tear of getting older. Shoulder injuries can be slow to heal, but your shoulder should get better with time. Your doctor may recommend a sling to rest your shoulder. If you have injured your shoulder, you may need testing and treatment. Follow-up care is a key part of your treatment and safety. Be sure to make and go to all appointments, and call your doctor if you are having problems. It's also a good idea to know your test results and keep a list of the medicines you take. How can you care for yourself at home? · Take pain medicines exactly as directed. ¨ If the doctor gave you a prescription medicine for pain, take it as prescribed. ¨ If you are not taking a prescription pain medicine, ask your doctor if you can take an over-the-counter medicine. ¨ Do not take two or more pain medicines at the same time unless the doctor told you to. Many pain medicines contain acetaminophen, which is Tylenol. Too much acetaminophen (Tylenol) can be harmful. · If your doctor recommends that you wear a sling, use it as directed. Do not take it off before your doctor tells you to. · Put ice or a cold pack on the sore area for 10 to 20 minutes at a time. Put a thin cloth between the ice and your skin. · If there is no swelling, you can put moist heat, a heating pad, or a warm cloth on your shoulder. Some doctors suggest alternating between hot and cold. · Rest your shoulder for a few days. If your doctor recommends it, you can then begin gentle exercise of the shoulder, but do not lift anything heavy. When should you call for help? Call 911 anytime you think you may need emergency care. For example, call if:  ? · You have chest pain or pressure. This may occur with:  ¨ Sweating. ¨ Shortness of breath. ¨ Nausea or vomiting. ¨ Pain that spreads from the chest to the neck, jaw, or one or both shoulders or arms. ¨ Dizziness or lightheadedness. ¨ A fast or uneven pulse. After calling 911, chew 1 adult-strength aspirin.  Wait for an ambulance. Do not try to drive yourself. ? · Your arm or hand is cool or pale or changes color. ?Call your doctor now or seek immediate medical care if:  ? · You have signs of infection, such as:  ¨ Increased pain, swelling, warmth, or redness in your shoulder. ¨ Red streaks leading from a place on your shoulder. ¨ Pus draining from an area of your shoulder. ¨ Swollen lymph nodes in your neck, armpits, or groin. ¨ A fever. ? Watch closely for changes in your health, and be sure to contact your doctor if:  ? · You cannot use your shoulder. ? · Your shoulder does not get better as expected. Where can you learn more? Go to http://daniel-winston.info/. Enter W429 in the search box to learn more about \"Shoulder Pain: Care Instructions. \"  Current as of: March 21, 2017  Content Version: 11.4  © 3151-0358 [employer]. Care instructions adapted under license by [employer] (which disclaims liability or warranty for this information). If you have questions about a medical condition or this instruction, always ask your healthcare professional. [place] [address] any warranty or liability for your use of this information. Urinary Tract Infection in Women: Care Instructions  Your Care Instructions    A urinary tract infection, or UTI, is a general term for an infection anywhere between the kidneys and the urethra (where urine comes out). Most UTIs are bladder infections. They often cause pain or burning when you urinate. UTIs are caused by bacteria and can be cured with antibiotics. Be sure to complete your treatment so that the infection goes away. Follow-up care is a key part of your treatment and safety. Be sure to make and go to all appointments, and call your doctor if you are having problems. It's also a good idea to know your test results and keep a list of the medicines you take. How can you care for yourself at home?   · Take your antibiotics as directed. Do not stop taking them just because you feel better. You need to take the full course of antibiotics. · Drink extra water and other fluids for the next day or two. This may help wash out the bacteria that are causing the infection. (If you have kidney, heart, or liver disease and have to limit fluids, talk with your doctor before you increase your fluid intake.)  · Avoid drinks that are carbonated or have caffeine. They can irritate the bladder. · Urinate often. Try to empty your bladder each time. · To relieve pain, take a hot bath or lay a heating pad set on low over your lower belly or genital area. Never go to sleep with a heating pad in place. To prevent UTIs  · Drink plenty of water each day. This helps you urinate often, which clears bacteria from your system. (If you have kidney, heart, or liver disease and have to limit fluids, talk with your doctor before you increase your fluid intake.)  · Urinate when you need to. · Urinate right after you have sex. · Change sanitary pads often. · Avoid douches, bubble baths, feminine hygiene sprays, and other feminine hygiene products that have deodorants. · After going to the bathroom, wipe from front to back. When should you call for help? Call your doctor now or seek immediate medical care if:  ? · Symptoms such as fever, chills, nausea, or vomiting get worse or appear for the first time. ? · You have new pain in your back just below your rib cage. This is called flank pain. ? · There is new blood or pus in your urine. ? · You have any problems with your antibiotic medicine. ? Watch closely for changes in your health, and be sure to contact your doctor if:  ? · You are not getting better after taking an antibiotic for 2 days. ? · Your symptoms go away but then come back. Where can you learn more? Go to http://daniel-winston.info/.   Enter M739 in the search box to learn more about \"Urinary Tract Infection in Women: Care Instructions. \"  Current as of: May 12, 2017  Content Version: 11.4  © 4000-0264 [employer]. Care instructions adapted under license by [employer] (which disclaims liability or warranty for this information). If you have questions about a medical condition or this instruction, always ask your healthcare professional. [place] [address] any warranty or liability for your use of this information.

## 2018-05-24 LAB
BACTERIA SPEC CULT: NORMAL
CC UR VC: NORMAL
SERVICE CMNT-IMP: NORMAL

## 2018-06-25 ENCOUNTER — TELEPHONE (OUTPATIENT)
Dept: FAMILY MEDICINE CLINIC | Age: 38
End: 2018-06-25

## 2018-06-25 ENCOUNTER — OFFICE VISIT (OUTPATIENT)
Dept: FAMILY MEDICINE CLINIC | Age: 38
End: 2018-06-25

## 2018-06-25 VITALS
WEIGHT: 270.6 LBS | HEART RATE: 83 BPM | SYSTOLIC BLOOD PRESSURE: 113 MMHG | TEMPERATURE: 99.4 F | HEIGHT: 66 IN | BODY MASS INDEX: 43.49 KG/M2 | RESPIRATION RATE: 16 BRPM | DIASTOLIC BLOOD PRESSURE: 77 MMHG | OXYGEN SATURATION: 100 %

## 2018-06-25 DIAGNOSIS — Z32.01 POSITIVE PREGNANCY TEST: Primary | ICD-10-CM

## 2018-06-25 DIAGNOSIS — N91.2 AMENORRHEA: ICD-10-CM

## 2018-06-25 LAB
HCG URINE, QL. (POC): POSITIVE
VALID INTERNAL CONTROL?: YES

## 2018-06-25 RX ORDER — AMOXICILLIN AND CLAVULANATE POTASSIUM 500; 125 MG/1; MG/1
TABLET, FILM COATED ORAL
COMMUNITY
Start: 2018-05-02 | End: 2018-06-25 | Stop reason: ALTCHOICE

## 2018-06-25 RX ORDER — FLUTICASONE PROPIONATE 50 MCG
SPRAY, SUSPENSION (ML) NASAL
COMMUNITY
Start: 2018-05-02 | End: 2018-06-25

## 2018-06-25 NOTE — PATIENT INSTRUCTIONS
Learning About Pregnancy  Your Care Instructions    Your health in the early weeks of your pregnancy is particularly important for your baby's health. Take good care of yourself. Anything you do that harms your body can also harm your baby. Make sure to go to all of your doctor appointments. Regular checkups will help keep you and your baby healthy. How can you care for yourself at home? Diet  ? · Eat a balanced diet. Make sure your diet includes plenty of beans, peas, and leafy green vegetables. ? · Do not skip meals or go for many hours without eating. If you are nauseated, try to eat a small, healthy snack every 2 to 3 hours. ? · Do not eat fish that has a high level of mercury, such as shark, swordfish, or mackerel. Do not eat more than one can of tuna each week. ? · Drink plenty of fluids, enough so that your urine is light yellow or clear like water. If you have kidney, heart, or liver disease and have to limit fluids, talk with your doctor before you increase the amount of fluids you drink. ? · Cut down on caffeine, such as coffee, tea, and cola. ? · Do not drink alcohol, such as beer, wine, or hard liquor. ? · Take a multivitamin that contains at least 400 micrograms (mcg) of folic acid to help prevent birth defects. Fortified cereal and whole wheat bread are good additional sources of folic acid. ? · Increase the calcium in your diet. Try to drink a quart of skim milk each day. You may also take calcium supplements and choose foods such as cheese and yogurt. ? Lifestyle  ? · Make sure you go to your follow-up appointments. ? · Get plenty of rest. You may be unusually tired while you are pregnant. ? · Get at least 30 minutes of exercise on most days of the week. Walking is a good choice. If you have not exercised in the past, start out slowly. Take several short walks each day. ? · Do not smoke. If you need help quitting, talk to your doctor about stop-smoking programs.  These can increase your chances of quitting for good. ? · Do not touch cat feces or litter boxes. Also, wash your hands after you handle raw meat, and fully cook all meat before you eat it. Wear gloves when you work in the yard or garden, and wash your hands well when you are done. Cat feces, raw or undercooked meat, and contaminated dirt can cause an infection that may harm your baby or lead to a miscarriage. ? · Do not use saunas or hot tubs. Raising your body temperature may harm your baby. ? · Avoid chemical fumes, paint fumes, or poisons. ? · Do not use illegal drugs or alcohol. Medicines  ? · Review all of your medicines with your doctor. Some of your routine medicines may need to be changed to protect your baby. ? · Use acetaminophen (Tylenol) to relieve minor problems, such as a mild headache or backache or a mild fever with cold symptoms. Do not use nonsteroidal anti-inflammatory drugs (NSAIDs), such as ibuprofen (Advil, Motrin) or naproxen (Aleve), unless your doctor says it is okay. ? · Do not take two or more pain medicines at the same time unless the doctor told you to. Many pain medicines have acetaminophen, which is Tylenol. Too much acetaminophen (Tylenol) can be harmful. ? · Take your medicines exactly as prescribed. Call your doctor if you think you are having a problem with your medicine. ?To manage morning sickness  ? · If you feel sick when you first wake up, try eating a small snack (such as crackers) before you get out of bed. Allow some time to digest the snack, and then get out of bed slowly. ? · Do not skip meals or go for long periods without eating. An empty stomach can make nausea worse. ? · Eat small, frequent meals instead of three large meals each day. ? · Drink plenty of fluids. Sports drinks, such as Gatorade or Powerade, are good choices. ? · Eat foods that are high in protein but low in fat.    ? · If you are taking iron supplements, ask your doctor if they are necessary. Iron can make nausea worse. ? · Avoid any smells, such as coffee, that make you feel sick. ? · Get lots of rest. Morning sickness may be worse when you are tired. Follow-up care is a key part of your treatment and safety. Be sure to make and go to all appointments, and call your doctor if you are having problems. It's also a good idea to know your test results and keep a list of the medicines you take. Where can you learn more? Go to http://daniel-winston.info/. Enter O762 in the search box to learn more about \"Learning About Pregnancy. \"  Current as of: March 16, 2017  Content Version: 11.4  © 1709-5703 [employer]. Care instructions adapted under license by [employer] (which disclaims liability or warranty for this information). If you have questions about a medical condition or this instruction, always ask your healthcare professional. [address] any warranty or liability for your use of this information.

## 2018-06-25 NOTE — MR AVS SNAPSHOT
Aishwarya Scuddy 
 
 
 [address] [facility] [place]  [address] [phone] Patient: Mehreen Piedra MRN: [id number] MDT:0/4/7646 Visit Information Date & Time Provider Department Dept. Phone Encounter #  
 6/25/2018  3:00 PM Anup Tsang NP [facility] [phone] [id number] Follow-up Instructions Return if symptoms worsen or fail to improve. Upcoming Health Maintenance Date Due Pneumococcal 19-64 Medium Risk (1 of 1 - PPSV23) 9/4/1999 PAP AKA CERVICAL CYTOLOGY 9/4/2001 DTaP/Tdap/Td series (1 - Tdap) 7/21/2014 Influenza Age 5 to Adult 8/1/2018 Allergies as of 6/25/2018  Review Complete On: 6/25/2018 By: Anup Tsang NP No Known Allergies Current Immunizations  Never Reviewed Name Date Influenza Vaccine 10/26/2015 MMR 5/20/1993 Td 7/20/2014 Not reviewed this visit You Were Diagnosed With   
  
 Codes Comments Positive pregnancy test    -  Primary ICD-10-CM: Z32.01 
ICD-9-CM: V72.42 Amenorrhea     ICD-10-CM: N91.2 ICD-9-CM: 626.0 Vitals BP Pulse Temp Resp Height(growth percentile) Weight(growth percentile) 113/77 (BP 1 Location: Left arm, BP Patient Position: Sitting) 83 99.4 °F (37.4 °C) (Oral) 16 5' 6\" (1.676 m) 270 lb 9.6 oz (122.7 kg) LMP SpO2 BMI OB Status Smoking Status 04/25/2018 100% 43.68 kg/m2 Having regular periods Light Tobacco Smoker BMI and BSA Data Body Mass Index Body Surface Area  
 43.68 kg/m 2 2.39 m 2 Preferred Pharmacy Pharmacy Name Phone [address] [facility] [address] , [address] [place] [address] [phone] Your Updated Medication List  
  
   
This list is accurate as of 6/25/18  3:40 PM.  Always use your most recent med list.  
  
  
  
  
 prenatal vit-iron fumarate-fa 27 mg iron- 0.8 mg Tab tablet Take 1 Tab by mouth daily. Indications: pregnancy Prescriptions Sent to Pharmacy Refills  
 prenatal vit-iron fumarate-fa 27 mg iron- 0.8 mg tab tablet 11 Sig: Take 1 Tab by mouth daily. Indications: pregnancy Class: Normal  
 Pharmacy: [facility] [address], [address] [facility] #: [phone] Route: Oral  
  
We Performed the Following AMB POC URINE PREGNANCY TEST, VISUAL COLOR COMPARISON [30512 CPT(R)] REFERRAL TO OBSTETRICS AND GYNECOLOGY [REF51 Custom] Follow-up Instructions Return if symptoms worsen or fail to improve. Referral Information Referral ID Referred By Referred To  
  
 [id number] Lieutenant Paige MD   
   [address], [address] Phone: [phone] Fax: [phone] Visits Status Start Date End Date 1 New Request 6/25/18 6/25/19 If your referral has a status of pending review or denied, additional information will be sent to support the outcome of this decision. Patient Instructions Learning About Pregnancy Your Care Instructions Your health in the early weeks of your pregnancy is particularly important for your baby's health. Take good care of yourself. Anything you do that harms your body can also harm your baby. Make sure to go to all of your doctor appointments. Regular checkups will help keep you and your baby healthy. How can you care for yourself at home? Diet ? · Eat a balanced diet. Make sure your diet includes plenty of beans, peas, and leafy green vegetables. ? · Do not skip meals or go for many hours without eating. If you are nauseated, try to eat a small, healthy snack every 2 to 3 hours. ? · Do not eat fish that has a high level of mercury, such as shark, swordfish, or mackerel. Do not eat more than one can of tuna each week.   
? · Drink plenty of fluids, enough so that your urine is light yellow or clear like water. If you have kidney, heart, or liver disease and have to limit fluids, talk with your doctor before you increase the amount of fluids you drink. ? · Cut down on caffeine, such as coffee, tea, and cola. ? · Do not drink alcohol, such as beer, wine, or hard liquor. ? · Take a multivitamin that contains at least 400 micrograms (mcg) of folic acid to help prevent birth defects. Fortified cereal and whole wheat bread are good additional sources of folic acid. ? · Increase the calcium in your diet. Try to drink a quart of skim milk each day. You may also take calcium supplements and choose foods such as cheese and yogurt. ? Lifestyle ? · Make sure you go to your follow-up appointments. ? · Get plenty of rest. You may be unusually tired while you are pregnant. ? · Get at least 30 minutes of exercise on most days of the week. Walking is a good choice. If you have not exercised in the past, start out slowly. Take several short walks each day. ? · Do not smoke. If you need help quitting, talk to your doctor about stop-smoking programs. These can increase your chances of quitting for good. ? · Do not touch cat feces or litter boxes. Also, wash your hands after you handle raw meat, and fully cook all meat before you eat it. Wear gloves when you work in the yard or garden, and wash your hands well when you are done. Cat feces, raw or undercooked meat, and contaminated dirt can cause an infection that may harm your baby or lead to a miscarriage. ? · Do not use saunas or hot tubs. Raising your body temperature may harm your baby. ? · Avoid chemical fumes, paint fumes, or poisons. ? · Do not use illegal drugs or alcohol. Medicines ? · Review all of your medicines with your doctor. Some of your routine medicines may need to be changed to protect your baby.   
? · Use acetaminophen (Tylenol) to relieve minor problems, such as a mild headache or backache or a mild fever with cold symptoms. Do not use nonsteroidal anti-inflammatory drugs (NSAIDs), such as ibuprofen (Advil, Motrin) or naproxen (Aleve), unless your doctor says it is okay. ? · Do not take two or more pain medicines at the same time unless the doctor told you to. Many pain medicines have acetaminophen, which is Tylenol. Too much acetaminophen (Tylenol) can be harmful. ? · Take your medicines exactly as prescribed. Call your doctor if you think you are having a problem with your medicine. ?To manage morning sickness ? · If you feel sick when you first wake up, try eating a small snack (such as crackers) before you get out of bed. Allow some time to digest the snack, and then get out of bed slowly. ? · Do not skip meals or go for long periods without eating. An empty stomach can make nausea worse. ? · Eat small, frequent meals instead of three large meals each day. ? · Drink plenty of fluids. Sports drinks, such as Gatorade or Powerade, are good choices. ? · Eat foods that are high in protein but low in fat. ? · If you are taking iron supplements, ask your doctor if they are necessary. Iron can make nausea worse. ? · Avoid any smells, such as coffee, that make you feel sick. ? · Get lots of rest. Morning sickness may be worse when you are tired. Follow-up care is a key part of your treatment and safety. Be sure to make and go to all appointments, and call your doctor if you are having problems. It's also a good idea to know your test results and keep a list of the medicines you take. Where can you learn more? Go to http://daniel-winston.info/. Enter D525 in the search box to learn more about \"Learning About Pregnancy. \" Current as of: March 16, 2017 Content Version: 11.4 © 0261-4045 Healthwise, [employer].  Care instructions adapted under license by [employer] (which disclaims liability or warranty for this information). If you have questions about a medical condition or this instruction, always ask your healthcare professional. [address] any warranty or liability for your use of this information. Introducing [facility] SERVICES! [employer] introduces Posterbee patient portal. Now you can access parts of your medical record, email your doctor's office, and request medication refills online. 1. In your internet browser, go to [email or web address] 2. Click on the First Time User? Click Here link in the Sign In box. You will see the New Member Sign Up page. 3. Enter your Posterbee Access Code exactly as it appears below. You will not need to use this code after youve completed the sign-up process. If you do not sign up before the expiration date, you must request a new code. · Posterbee Access Code: [email or web address] Expires: 9/23/2018  2:40 PM 
 
4. Enter the last four digits of your Social Security Number (xxxx) and Date of Birth (mm/dd/yyyy) as indicated and click Submit. You will be taken to the next sign-up page. 5. Create a Posterbee ID. This will be your Posterbee login ID and cannot be changed, so think of one that is secure and easy to remember. 6. Create a Posterbee password. You can change your password at any time. 7. Enter your Password Reset Question and Answer. This can be used at a later time if you forget your password. 8. Enter your e-mail address. You will receive e-mail notification when new information is available in [address] 9. Click Sign Up. You can now view and download portions of your medical record. 10. Click the Download Summary menu link to download a portable copy of your medical information. If you have questions, please visit the Frequently Asked Questions section of the Posterbee website. Remember, Posterbee is NOT to be used for urgent needs. For medical emergencies, dial 911. Now available from your iPhone and Android! Please provide this summary of care documentation to your next provider. Your primary care clinician is listed as Caleb Fernandez. If you have any questions after today's visit, please call [phone].

## 2018-06-25 NOTE — TELEPHONE ENCOUNTER
Patient would like to know if she can be seen today or tomorrow for possible pregnancy. Patient stated she had a miscarriage in May and took two pregnancy test that had positive results. Patient stated she will need to be on medication ASAP. Patient can be reached at [phone].

## 2018-06-25 NOTE — PROGRESS NOTES
Chief Complaint   Patient presents with    Missed Menses     Patient here for pregnancy. Patient had period in April. Patient states she had a misscarriage in May and two positive home pregnancy tests yesterday.

## 2018-06-25 NOTE — PROGRESS NOTES
HISTORY OF PRESENT ILLNESS  Drew Navas is a 40 y.o. female. HPI  Patient with PMHx significant for morbid obesity, anxiety/depression, pre-diabetes here today for an acute visit with CC of possible pregnancy. Her last menstrual period was in April 2018. She had a miscarriage in May 2018 at approximately 5 weeks. However, she has not had a period since that time and took a home pregnancy test yesterday because she was having some breast tenderness; the home test was positive. She is happy to hear that she is pregnant. She does not have a gynecologist.   Tilda.Milks. She is still smoking 1 - 2 Black and Mild cigarettes daily. Visit Vitals    /77 (BP 1 Location: Left arm, BP Patient Position: Sitting)    Pulse 83    Temp 99.4 °F (37.4 °C) (Oral)    Resp 16    Ht 5' 6\" (1.676 m)    Wt 270 lb 9.6 oz (122.7 kg)    LMP 04/25/2018  Comment: MISCARRIAGE IN MAY    SpO2 100%    BMI 43.68 kg/m2     No current outpatient prescriptions on file prior to visit. No current facility-administered medications on file prior to visit. .      Review of Systems   Constitutional: Negative for chills, fever and malaise/fatigue. Eyes: Negative for blurred vision and double vision. Respiratory: Negative for cough and shortness of breath. Cardiovascular: Negative for chest pain, palpitations, orthopnea and leg swelling. Neurological: Negative for dizziness and headaches. Physical Exam   Constitutional: She is oriented to person, place, and time. She appears well-developed and well-nourished. No distress. Pulmonary/Chest: Effort normal. No respiratory distress. Neurological: She is alert and oriented to person, place, and time. Skin: Skin is warm and dry. She is not diaphoretic. Psychiatric: She has a normal mood and affect. Her behavior is normal. Judgment normal.   Nursing note and vitals reviewed. ASSESSMENT and PLAN    ICD-10-CM ICD-9-CM    1.  Positive pregnancy test Z32.01 V72.42 REFERRAL TO OBSTETRICS AND GYNECOLOGY      prenatal vit-iron fumarate-fa 27 mg iron- 0.8 mg tab tablet   2. Amenorrhea N91.2 626.0 AMB POC URINE PREGNANCY TEST, VISUAL COLOR COMPARISON     1. Positive pregnancy test - Stop smoking, start prenatal vitamin. Referral placed to OB/GYN, and patient encouraged to schedule this herself, as her LMP is unclear since she had a miscarriage in May. Bleeding precautions reviewed. Follow up here PRN for now, for routine care after pregnancy. Follow-up Disposition:  Return if symptoms worsen or fail to improve.

## 2018-07-12 LAB
HBSAG, EXTERNAL: NEGATIVE
HIV, EXTERNAL: NON REACTIVE
RUBELLA, EXTERNAL: NORMAL
T. PALLIDUM, EXTERNAL: NEGATIVE

## 2018-09-14 ENCOUNTER — HOSPITAL ENCOUNTER (EMERGENCY)
Age: 38
Discharge: HOME | End: 2018-09-14
Attending: STUDENT IN AN ORGANIZED HEALTH CARE EDUCATION/TRAINING PROGRAM
Payer: MEDICAID

## 2018-09-14 VITALS
HEIGHT: 67 IN | WEIGHT: 264.8 LBS | RESPIRATION RATE: 18 BRPM | HEART RATE: 86 BPM | DIASTOLIC BLOOD PRESSURE: 80 MMHG | SYSTOLIC BLOOD PRESSURE: 112 MMHG | OXYGEN SATURATION: 99 % | BODY MASS INDEX: 41.56 KG/M2 | TEMPERATURE: 98.3 F

## 2018-09-14 DIAGNOSIS — N30.01 ACUTE CYSTITIS WITH HEMATURIA: ICD-10-CM

## 2018-09-14 DIAGNOSIS — O99.612 CONSTIPATION DURING PREGNANCY IN SECOND TRIMESTER: ICD-10-CM

## 2018-09-14 DIAGNOSIS — S39.012A STRAIN OF LUMBAR PARASPINOUS MUSCLE, INITIAL ENCOUNTER: Primary | ICD-10-CM

## 2018-09-14 DIAGNOSIS — K59.00 CONSTIPATION DURING PREGNANCY IN SECOND TRIMESTER: ICD-10-CM

## 2018-09-14 LAB
APPEARANCE UR: ABNORMAL
BACTERIA URNS QL MICRO: ABNORMAL /HPF
BILIRUB UR QL CFM: NEGATIVE
COLOR UR: ABNORMAL
EPITH CASTS URNS QL MICRO: ABNORMAL /LPF
GLUCOSE UR STRIP.AUTO-MCNC: NEGATIVE MG/DL
HGB UR QL STRIP: NEGATIVE
KETONES UR QL STRIP.AUTO: ABNORMAL MG/DL
LEUKOCYTE ESTERASE UR QL STRIP.AUTO: ABNORMAL
MUCOUS THREADS URNS QL MICRO: ABNORMAL /LPF
NITRITE UR QL STRIP.AUTO: NEGATIVE
PH UR STRIP: 5.5 [PH] (ref 5–8)
PROT UR STRIP-MCNC: ABNORMAL MG/DL
RBC #/AREA URNS HPF: ABNORMAL /HPF (ref 0–5)
SP GR UR REFRACTOMETRY: >1.03 (ref 1–1.03)
UR CULT HOLD, URHOLD: NORMAL
UROBILINOGEN UR QL STRIP.AUTO: 1 EU/DL (ref 0.2–1)
WBC URNS QL MICRO: ABNORMAL /HPF (ref 0–4)

## 2018-09-14 PROCEDURE — 81001 URINALYSIS AUTO W/SCOPE: CPT | Performed by: NURSE PRACTITIONER

## 2018-09-14 PROCEDURE — 99283 EMERGENCY DEPT VISIT LOW MDM: CPT

## 2018-09-14 PROCEDURE — 74011000250 HC RX REV CODE- 250: Performed by: PHYSICIAN ASSISTANT

## 2018-09-14 RX ORDER — LIDOCAINE 50 MG/G
PATCH TOPICAL
Qty: 15 EACH | Refills: 0 | Status: SHIPPED | OUTPATIENT
Start: 2018-09-14 | End: 2019-12-16

## 2018-09-14 RX ORDER — CEPHALEXIN 500 MG/1
500 CAPSULE ORAL 3 TIMES DAILY
Qty: 21 CAP | Refills: 0 | Status: SHIPPED | OUTPATIENT
Start: 2018-09-14 | End: 2018-09-21

## 2018-09-14 RX ORDER — LIDOCAINE 50 MG/G
2 PATCH TOPICAL ONCE
Status: DISCONTINUED | OUTPATIENT
Start: 2018-09-14 | End: 2018-09-14 | Stop reason: HOSPADM

## 2018-09-14 NOTE — ED TRIAGE NOTES
Pt to ED for c/o lower back pain. Has had lower back pain throughout this pregnancy, but says pain worsened yesterday. Describes aching, sharp pain in mid lower back, radiating to bilateral upper legs. Hx of slipped disc in lower back. Also reports constipation x 5 days.

## 2018-09-14 NOTE — ED NOTES
Pt given discharge instructions, patient education, 3 prescriptions, and follow up information. Pt verbalizes understanding. All questions answered. Pt discharged to home in private vehicle, ambulatory. Pt A/Ox4, RA, pain controlled. Patient given work note as well.

## 2018-09-14 NOTE — ED NOTES
 
3:27 PM 
Patient 16 weeks pregnant, constipated for 5 days. C/O lower back pain now. No vaginal bleeding, positive vaginal discharge white. Patient at [facility]. Denies any fever or chills, No nausea or vomiting.   
Matthew Brown, DNP

## 2018-09-14 NOTE — DISCHARGE INSTRUCTIONS
We hope that we have addressed all of your medical concerns. The examination and treatment you received in the Emergency Department were for an emergent problem and were not intended as complete care. It is important that you follow up with your healthcare provider(s) for ongoing care. If your symptoms worsen or do not improve as expected, and you are unable to reach your usual health care provider(s), you should return to the Emergency Department. Today's healthcare is undergoing tremendous change, and patient satisfaction surveys are one of the many tools to assess the quality of medical care. You may receive a survey from the [facility] regarding your experience in the Emergency Department. I hope that your experience has been completely positive, particularly the medical care that I provided. As such, please participate in the survey; anything less than excellent does not meet my expectations or intentions. Formerly [facility] and [address] [place] [address] participate in nationally recognized quality of care measures. If your blood pressure is greater than 120/80, as reported below, we urge that you seek medical care to address the potential of high blood pressure, commonly known as hypertension. Hypertension can be hereditary or can be caused by certain medical conditions, pain, stress, or \"white coat syndrome. \"       Please make an appointment with your health care provider(s) for follow up of your Emergency Department visit. VITALS:   Patient Vitals for the past 8 hrs:   Temp Pulse Resp BP SpO2   09/14/18 1525 98.7 °F (37.1 °C) (!) 105 18 108/68 97 %          Thank you for allowing us to provide you with medical care today. We realize that you have many choices for your emergency care needs. Please choose us in the future for any continued health care needs. Tom Tatum, [facility]. Office: [phone]            Recent Results (from the past 24 hour(s))   URINALYSIS W/MICROSCOPIC    Collection Time: 09/14/18  3:35 PM   Result Value Ref Range    Color DARK YELLOW      Appearance TURBID (A) CLEAR      Specific gravity >1.030 (H) 1.003 - 1.030    pH (UA) 5.5 5.0 - 8.0      Protein TRACE (A) NEG mg/dL    Glucose NEGATIVE  NEG mg/dL    Ketone TRACE (A) NEG mg/dL    Blood NEGATIVE  NEG      Urobilinogen 1.0 0.2 - 1.0 EU/dL    Nitrites NEGATIVE  NEG      Leukocyte Esterase MODERATE (A) NEG      WBC 5-10 0 - 4 /hpf    RBC 0-5 0 - 5 /hpf    Epithelial cells MANY (A) FEW /lpf    Bacteria 1+ (A) NEG /hpf    Mucus 1+ (A) NEG /lpf   URINE CULTURE HOLD SAMPLE    Collection Time: 09/14/18  3:35 PM   Result Value Ref Range    Urine culture hold        URINE ON HOLD IN MICROBIOLOGY DEPT FOR 3 DAYS. IF UNPRESERVED URINE IS SUBMITTED, IT CANNOT BE USED FOR ADDITIONAL TESTING AFTER 24 HRS, RECOLLECTION WILL BE REQUIRED. BILIRUBIN, CONFIRM    Collection Time: 09/14/18  3:35 PM   Result Value Ref Range    Bilirubin UA, confirm NEGATIVE  NEG         No results found. Constipation: Care Instructions  Your Care Instructions    Constipation means that you have a hard time passing stools (bowel movements). People pass stools from 3 times a day to once every 3 days. What is normal for you may be different. Constipation may occur with pain in the rectum and cramping. The pain may get worse when you try to pass stools. Sometimes there are small amounts of bright red blood on toilet paper or the surface of stools. This is because of enlarged veins near the rectum (hemorrhoids). A few changes in your diet and lifestyle may help you avoid ongoing constipation. Your doctor may also prescribe medicine to help loosen your stool. Some medicines can cause constipation. These include pain medicines and antidepressants. Tell your doctor about all the medicines you take.  Your doctor may want to make a medicine change to ease your symptoms. Follow-up care is a key part of your treatment and safety. Be sure to make and go to all appointments, and call your doctor if you are having problems. It's also a good idea to know your test results and keep a list of the medicines you take. How can you care for yourself at home? · Drink plenty of fluids, enough so that your urine is light yellow or clear like water. If you have kidney, heart, or liver disease and have to limit fluids, talk with your doctor before you increase the amount of fluids you drink. · Include high-fiber foods in your diet each day. These include fruits, vegetables, beans, and whole grains. · Get at least 30 minutes of exercise on most days of the week. Walking is a good choice. You also may want to do other activities, such as running, swimming, cycling, or playing tennis or team sports. · Take a fiber supplement, such as Citrucel or Metamucil, every day. Read and follow all instructions on the label. · Schedule time each day for a bowel movement. A daily routine may help. Take your time having your bowel movement. · Support your feet with a small step stool when you sit on the toilet. This helps flex your hips and places your pelvis in a squatting position. · Your doctor may recommend an over-the-counter laxative to relieve your constipation. Examples are Milk of Magnesia and MiraLax. Read and follow all instructions on the label. Do not use laxatives on a long-term basis. When should you call for help? Call your doctor now or seek immediate medical care if:    · You have new or worse belly pain.     · You have new or worse nausea or vomiting.     · You have blood in your stools.    Watch closely for changes in your health, and be sure to contact your doctor if:    · Your constipation is getting worse.     · You do not get better as expected. Where can you learn more? Go to http://daniel-winston.info/.   Enter 21 [phone] in the search box to learn more about \"Constipation: Care Instructions. \"  Current as of: November 20, 2017  Content Version: 11.7  © 2962-0679 [employer]. Care instructions adapted under license by [employer] (which disclaims liability or warranty for this information). If you have questions about a medical condition or this instruction, always ask your healthcare professional. [address] any warranty or liability for your use of this information. Low Back Pain: Exercises  Your Care Instructions  Here are some examples of typical rehabilitation exercises for your condition. Start each exercise slowly. Ease off the exercise if you start to have pain. Your doctor or physical therapist will tell you when you can start these exercises and which ones will work best for you. How to do the exercises  Press-up    1. Lie on your stomach, supporting your body with your forearms. 2. Press your elbows down into the floor to raise your upper back. As you do this, relax your stomach muscles and allow your back to arch without using your back muscles. As your press up, do not let your hips or pelvis come off the floor. 3. Hold for 15 to 30 seconds, then relax. 4. Repeat 2 to 4 times. Alternate arm and leg (bird dog) exercise    Do this exercise slowly. Try to keep your body straight at all times, and do not let one hip drop lower than the other. 1. Start on the floor, on your hands and knees. 2. Tighten your belly muscles. 3. Raise one leg off the floor, and hold it straight out behind you. Be careful not to let your hip drop down, because that will twist your trunk. 4. Hold for about 6 seconds, then lower your leg and switch to the other leg. 5. Repeat 8 to 12 times on each leg. 6. Over time, work up to holding for 10 to 30 seconds each time.   7. If you feel stable and secure with your leg raised, try raising the opposite arm straight out in front of you at the same time.  Knee-to-chest exercise    1. Lie on your back with your knees bent and your feet flat on the floor. 2. Bring one knee to your chest, keeping the other foot flat on the floor (or keeping the other leg straight, whichever feels better on your lower back). 3. Keep your lower back pressed to the floor. Hold for at least 15 to 30 seconds. 4. Relax, and lower the knee to the starting position. 5. Repeat with the other leg. Repeat 2 to 4 times with each leg. 6. To get more stretch, put your other leg flat on the floor while pulling your knee to your chest.  Curl-ups    1. Lie on the floor on your back with your knees bent at a 90-degree angle. Your feet should be flat on the floor, about 12 inches from your buttocks. 2. Cross your arms over your chest. If this bothers your neck, try putting your hands behind your neck (not your head), with your elbows spread apart. 3. Slowly tighten your belly muscles and raise your shoulder blades off the floor. 4. Keep your head in line with your body, and do not press your chin to your chest.  5. Hold this position for 1 or 2 seconds, then slowly lower yourself back down to the floor. 6. Repeat 8 to 12 times. Pelvic tilt exercise    1. Lie on your back with your knees bent. 2. \"Brace\" your stomach. This means to tighten your muscles by pulling in and imagining your belly button moving toward your spine. You should feel like your back is pressing to the floor and your hips and pelvis are rocking back. 3. Hold for about 6 seconds while you breathe smoothly. 4. Repeat 8 to 12 times. Heel dig bridging    1. Lie on your back with both knees bent and your ankles bent so that only your heels are digging into the floor. Your knees should be bent about 90 degrees. 2. Then push your heels into the floor, squeeze your buttocks, and lift your hips off the floor until your shoulders, hips, and knees are all in a straight line.   3. Hold for about 6 seconds as you continue to breathe normally, and then slowly lower your hips back down to the floor and rest for up to 10 seconds. 4. Do 8 to 12 repetitions. Hamstring stretch in doorway    1. Lie on your back in a doorway, with one leg through the open door. 2. Slide your leg up the wall to straighten your knee. You should feel a gentle stretch down the back of your leg. 3. Hold the stretch for at least 15 to 30 seconds. Do not arch your back, point your toes, or bend either knee. Keep one heel touching the floor and the other heel touching the wall. 4. Repeat with your other leg. 5. Do 2 to 4 times for each leg. Hip flexor stretch    1. Kneel on the floor with one knee bent and one leg behind you. Place your forward knee over your foot. Keep your other knee touching the floor. 2. Slowly push your hips forward until you feel a stretch in the upper thigh of your rear leg. 3. Hold the stretch for at least 15 to 30 seconds. Repeat with your other leg. 4. Do 2 to 4 times on each side. Wall sit    1. Stand with your back 10 to 12 inches away from a wall. 2. Lean into the wall until your back is flat against it. 3. Slowly slide down until your knees are slightly bent, pressing your lower back into the wall. 4. Hold for about 6 seconds, then slide back up the wall. 5. Repeat 8 to 12 times. Follow-up care is a key part of your treatment and safety. Be sure to make and go to all appointments, and call your doctor if you are having problems. It's also a good idea to know your test results and keep a list of the medicines you take. Where can you learn more? Go to http://daniel-winston.info/. Enter X750 in the search box to learn more about \"Low Back Pain: Exercises. \"  Current as of: November 29, 2017  Content Version: 11.7  © 8727-8069 [employer]. Care instructions adapted under license by [employer] (which disclaims liability or warranty for this information).  If you have questions about a medical condition or this instruction, always ask your healthcare professional. [address] any warranty or liability for your use of this information. Urinary Tract Infection in Pregnancy: Care Instructions  Your Care Instructions    A urinary tract infection, or UTI, is an infection of the bladder and other urinary structures. Most UTIs occur in the bladder. They often cause pain or burning when you urinate. UTI is the most common bacterial infection in pregnancy. If untreated, a UTI could lead to problems such as a kidney infection or  labor. Most UTIs can be cured with antibiotics. Your doctor will prescribe an antibiotic that is safe during pregnancy. Be sure to finish your medicine so that the infection does not spread to your kidneys. Follow-up care is a key part of your treatment and safety. Be sure to make and go to all appointments, and call your doctor if you are having problems. It's also a good idea to know your test results and keep a list of the medicines you take. How can you care for yourself at home? · Take your antibiotics as directed. Do not stop taking them just because you feel better. You need to take the full course of antibiotics. · Drink extra water and other fluids for the next day or two. This will help wash out the bacteria causing the infection. If you have kidney, heart, or liver disease and have to limit fluids, talk with your doctor before you increase the amount of fluids you drink. · Do not drink alcohol. · Urinate often. Try to empty your bladder each time. Preventing UTIs  · Drink plenty of fluids, enough so that your urine is light yellow or clear like water. This helps you urinate often, which clears bacteria from your system. If you have kidney, heart, or liver disease and have to limit fluids, talk with your doctor before you increase the amount of fluids you drink. · Urinate when you first have the urge.   · Urinate right after you have sex. This is the best way for women to avoid UTIs. · When going to the bathroom, wipe from front to back to keep bacteria from entering the vagina or urethra. When should you call for help? Call your doctor now or seek immediate medical care if:    · You have symptoms of a worse urinary tract infection. These may include:  ¨ Pain or burning when you urinate. ¨ A frequent need to urinate without being able to pass much urine. ¨ Pain in the flank, which is just below the rib cage and above the waist on either side of the back. ¨ Blood in your urine. ¨ A fever.    Watch closely for changes in your health, and be sure to contact your doctor if:    · You do not get better as expected. Where can you learn more? Go to http://daniel-winston.info/. Enter M982 in the search box to learn more about \"Urinary Tract Infection in Pregnancy: Care Instructions. \"  Current as of: November 21, 2017  Content Version: 11.7  © 2381-1357 [employer]. Care instructions adapted under license by [employer] (which disclaims liability or warranty for this information). If you have questions about a medical condition or this instruction, always ask your healthcare professional. [place] [address] any warranty or liability for your use of this information.

## 2018-09-14 NOTE — LETTER
[address]ina [address] 
[address] [place], [place] [address] [id number] 
[phone] Work/School Note Date: 9/14/2018 To Whom It May concern: 
 
Niko Bowie was seen and treated today in the emergency room by the following provider(s): 
Attending Provider: Alex Mclain MD 
Physician Assistant: Cha Peters PA-C. Niko Bowie may return to work on 9/18/2018. Sincerely, Cha Peters PA-C

## 2018-09-14 NOTE — ED PROVIDER NOTES
HPI Comments: Mart Hargrove is a 45 y.o. female with hx significant for 16wks pregnant, A2 who presents ambulatory to ER with c/o bilateral lower back pain. Pt reports that she started to feel bilateral lower back pain yesterday morning (18). Pt describes her pain as \"aching and sharp\" in quality, and rates her current pain as 10/10 in intensity. She states that her pain is constant, but is exacerbated with movement, particularly bending over and standing up straight from bent over position. Pt denies any recent injury to account for current pain. Pt reports that she has h/o back injury several years ago, and believes that this pain may be a \"flare-up\" of pain associated with that injury. Pt denies taking any medication for her pain PTA. Pt also complains of constipation. Pt denies urinary retention, incontinence, numbness or tingling. Pt is 16 weeks pregnant and denies any complications with current pregnancy. She reports that she sees [facility] for OB/GYN, and has a confirmed IUP. Also states that she receives regular prenatal care and has an appointment scheduled for Monday morning (18). Pt also complains of urinary frequency, but denies any other urinary symptoms. She specifically denies any fevers, chills, nausea, vomiting, chest pain, shortness of breath, headache, rash, diarrhea, abdominal pain, vaginal discharge, vaginal bleeding, sweating or weight loss. PCP: Benito Camacho NP  
OB/GYN: [facility] PMHx significant for: Past Medical History:  
No date: Depression PSHx significant for: History reviewed. No pertinent surgical history. No Known Allergies There are no other complaints, changes or physical findings at this time. Note written by Nabil Mcnamara, as dictated by Doc Hill PA-C 4:29 PM 
 
 
 
The history is provided by the patient and medical records. No  was used. Past Medical History: Diagnosis Date  Depression History reviewed. No pertinent surgical history. Family History:  
Problem Relation Age of Onset  Diabetes Mother  Hypertension Mother  Diabetes Father  Hypertension Father Social History Social History  Marital status: UNKNOWN Spouse name: N/A  
 Number of children: N/A  
 Years of education: N/A Occupational History  Not on file. Social History Main Topics  Smoking status: Light Tobacco Smoker Packs/day: 0.25  Smokeless tobacco: Never Used Comment: black and mild once in awhile- NO CIGERETTES  Alcohol use Yes Comment: social  
 Drug use: No  
 Sexual activity: Yes  
  Partners: Male Birth control/ protection: None Other Topics Concern  Not on file Social History Narrative ALLERGIES: Review of patient's allergies indicates no known allergies. Review of Systems Constitutional: Negative. Negative for appetite change, chills, diaphoresis, fatigue, fever and unexpected weight change. HENT: Negative. Negative for congestion and sore throat. Eyes: Negative. Negative for visual disturbance. Respiratory: Negative. Negative for cough and shortness of breath. Cardiovascular: Negative. Negative for chest pain, palpitations and leg swelling. Gastrointestinal: Positive for constipation. Negative for abdominal pain, diarrhea, nausea and vomiting. Genitourinary: Positive for frequency. Negative for dysuria, flank pain, hematuria, vaginal bleeding and vaginal discharge. Musculoskeletal: Positive for back pain (Bilateral lower back). Negative for neck pain. Skin: Negative. Negative for rash. Neurological: Negative. Negative for dizziness, syncope, weakness, numbness and headaches. Hematological: Negative. Psychiatric/Behavioral: Negative. All other systems reviewed and are negative. Vitals:  
 09/14/18 1525 BP: 108/68 Pulse: (!) 105 Resp: 18  
 Temp: 98.7 °F (37.1 °C) SpO2: 97% Weight: 120.1 kg (264 lb 12.8 oz) Height: 5' 7\" (1.702 m) Physical Exam  
Constitutional: She is oriented to person, place, and time. She appears well-developed and well-nourished. No distress. HENT:  
Head: Normocephalic and atraumatic. Mouth/Throat: Oropharynx is clear and moist.  
Neck: Normal range of motion. Cardiovascular: Normal rate, S1 normal, S2 normal, normal heart sounds and normal pulses. Exam reveals no gallop and no friction rub. No murmur heard. Pulses: 
     Dorsalis pedis pulses are 2+ on the right side, and 2+ on the left side. Pulmonary/Chest: Effort normal and breath sounds normal. No accessory muscle usage. No respiratory distress. She has no decreased breath sounds. She has no wheezes. She has no rhonchi. She has no rales. Abdominal: Soft. Normal appearance and bowel sounds are normal. She exhibits no distension and no mass. There is no hepatosplenomegaly. There is no tenderness. There is no rebound, no guarding and no CVA tenderness. Gravid abd Musculoskeletal: Normal range of motion. She exhibits no edema or tenderness. Mild bilateral lumbar paraspinal musculature TTP with spasm noted on exam. No midline cervical, thoracic, or lumbar spinal tenderness to palpation. FROM spine and all joints/extremities in ER without difficulty. Ambulatory in ER without difficulty. Neurological: She is alert and oriented to person, place, and time. She has normal strength. No sensory deficit. Coordination normal.  
Skin: Skin is warm and dry. No rash noted. She is not diaphoretic. No erythema. No pallor. Psychiatric: She has a normal mood and affect. Her behavior is normal.  
Nursing note and vitals reviewed. MDM Number of Diagnoses or Management Options Acute cystitis with hematuria:  
Constipation during pregnancy in second trimester:  
Strain of lumbar paraspinous muscle, initial encounter: Diagnosis management comments: DDx: low back strain, UTI, spasm Amount and/or Complexity of Data Reviewed Clinical lab tests: ordered and reviewed Discuss the patient with other providers: yes (Dr. Chelita Bruno) Patient Progress Patient progress: stable ED Course Procedures 4:39 PM  
Cooper Munoz PA-C discussed patient with Srikanth Yen MD who is in agreement with care plan as outlined. No further recommendations. Cooper Munoz PA-C  
 
 
4:39 PM 
Pt has been reevaluated. There are no new complaints, changes, or physical findings at this time. Medications have been reviewed w/ pt and/or family. Pt and/or family's questions have been answered. Pt and/or family expressed good understanding of the dx/tx/rx and is in agreement with plan of care. Pt instructed and agreed to f/u w/ OBGYN and to return to ED upon further deterioration. Pt is ready for discharge. LABORATORY TESTS: 
Recent Results (from the past 12 hour(s)) URINALYSIS W/MICROSCOPIC Collection Time: 09/14/18  3:35 PM  
Result Value Ref Range Color DARK YELLOW Appearance TURBID (A) CLEAR Specific gravity >1.030 (H) 1.003 - 1.030  
 pH (UA) 5.5 5.0 - 8.0 Protein TRACE (A) NEG mg/dL Glucose NEGATIVE  NEG mg/dL Ketone TRACE (A) NEG mg/dL Blood NEGATIVE  NEG Urobilinogen 1.0 0.2 - 1.0 EU/dL Nitrites NEGATIVE  NEG Leukocyte Esterase MODERATE (A) NEG    
 WBC 5-10 0 - 4 /hpf  
 RBC 0-5 0 - 5 /hpf Epithelial cells MANY (A) FEW /lpf Bacteria 1+ (A) NEG /hpf Mucus 1+ (A) NEG /lpf URINE CULTURE HOLD SAMPLE Collection Time: 09/14/18  3:35 PM  
Result Value Ref Range Urine culture hold URINE ON HOLD IN MICROBIOLOGY DEPT FOR 3 DAYS. IF UNPRESERVED URINE IS SUBMITTED, IT CANNOT BE USED FOR ADDITIONAL TESTING AFTER 24 HRS, RECOLLECTION WILL BE REQUIRED. BILIRUBIN, CONFIRM Collection Time: 09/14/18  3:35 PM  
Result Value Ref Range Bilirubin UA, confirm NEGATIVE  NEG    
 
 
IMAGING RESULTS: 
No orders to display No results found. MEDICATIONS GIVEN: 
Medications  
lidocaine (LIDODERM) 5 % patch 2 Patch (2 Patches TransDERmal Apply Patch 9/14/18 1618) IMPRESSION: 
1. Strain of lumbar paraspinous muscle, initial encounter 2. Constipation during pregnancy in second trimester 3. Acute cystitis with hematuria PLAN: 
1. Current Discharge Medication List  
  
START taking these medications Details  
lidocaine (LIDODERM) 5 % Apply patch to the affected area for 12 hours a day and remove for 12 hours a day. Qty: 15 Each, Refills: 0  
  
cephALEXin (KEFLEX) 500 mg capsule Take 1 Cap by mouth three (3) times daily for 7 days. Qty: 21 Cap, Refills: 0  
  
psyllium husk (METAMUCIL) 3.4 gram/5.4 gram powd Take 1 Packet by mouth daily for 30 days. Qty: 283 g, Refills: 0 CONTINUE these medications which have NOT CHANGED Details  
prenatal vit-iron fumarate-fa 27 mg iron- 0.8 mg tab tablet Take 1 Tab by mouth daily. Indications: pregnancy Qty: 30 Tab, Refills: 11  
 Associated Diagnoses: Positive pregnancy test  
  
  
 
2. Follow-up Information Follow up With Details Comments Contact Info Sharan Galeas NP Schedule an appointment as soon as possible for a visit in 3 days  [address] [place] [address] [phone] Your OBGYN Go to as scheduled Monday morning [facility] EMERGENCY DEP  If symptoms worsen [address] [facility] [address] 
[phone] Return to ED if worse

## 2018-09-28 NOTE — TELEPHONE ENCOUNTER
Unable to reach patient by phone. No numbers are working numbers. Letter to be sent requesting patient to reschedule cpe. rolling walker

## 2018-12-06 LAB
ANTIBODY SCREEN, EXTERNAL: NEGATIVE
TYPE, ABO & RH, EXTERNAL: NORMAL

## 2018-12-15 ENCOUNTER — HOSPITAL ENCOUNTER (EMERGENCY)
Age: 38
Discharge: HOME | End: 2018-12-15
Attending: OBSTETRICS & GYNECOLOGY | Admitting: OBSTETRICS & GYNECOLOGY
Payer: MEDICAID

## 2018-12-15 VITALS
RESPIRATION RATE: 18 BRPM | HEIGHT: 66 IN | SYSTOLIC BLOOD PRESSURE: 113 MMHG | OXYGEN SATURATION: 99 % | HEART RATE: 90 BPM | WEIGHT: 258 LBS | BODY MASS INDEX: 41.46 KG/M2 | DIASTOLIC BLOOD PRESSURE: 80 MMHG | TEMPERATURE: 98.2 F

## 2018-12-15 LAB
A1 MICROGLOB PLACENTAL VAG QL: NEGATIVE
A1 MICROGLOB PLACENTAL VAG QL: NEGATIVE
APPEARANCE UR: ABNORMAL
BACTERIA URNS QL MICRO: ABNORMAL /HPF
BASOPHILS # BLD: 0 K/UL (ref 0–0.1)
BASOPHILS NFR BLD: 0 % (ref 0–1)
BILIRUB UR QL CFM: NEGATIVE
COLOR UR: ABNORMAL
CONTROL LINE PRESENT?: NORMAL
CONTROL LINE PRESENT?: NORMAL
DAILY QC (YES/NO)?: YES
DAILY QC (YES/NO)?: YES
DIFFERENTIAL METHOD BLD: ABNORMAL
EOSINOPHIL # BLD: 0.1 K/UL (ref 0–0.4)
EOSINOPHIL NFR BLD: 1 % (ref 0–7)
EPITH CASTS URNS QL MICRO: ABNORMAL /LPF
ERYTHROCYTE [DISTWIDTH] IN BLOOD BY AUTOMATED COUNT: 15.1 % (ref 11.5–14.5)
EXPIRATION DATE: NORMAL
EXPIRATION DATE: NORMAL
GLUCOSE BLD STRIP.AUTO-MCNC: 109 MG/DL (ref 65–100)
GLUCOSE UR STRIP.AUTO-MCNC: NEGATIVE MG/DL
HCT VFR BLD AUTO: 32.4 % (ref 35–47)
HGB BLD-MCNC: 10.1 G/DL (ref 11.5–16)
HGB UR QL STRIP: NEGATIVE
IMM GRANULOCYTES # BLD: 0 K/UL (ref 0–0.04)
IMM GRANULOCYTES NFR BLD AUTO: 1 % (ref 0–0.5)
INTERNAL NEGATIVE CONTROL: NORMAL
INTERNAL NEGATIVE CONTROL: NORMAL
KETONES UR QL STRIP.AUTO: ABNORMAL MG/DL
KIT LOT NO.: NORMAL
KIT LOT NO.: NORMAL
LEUKOCYTE ESTERASE UR QL STRIP.AUTO: ABNORMAL
LYMPHOCYTES # BLD: 1.8 K/UL (ref 0.8–3.5)
LYMPHOCYTES NFR BLD: 29 % (ref 12–49)
MCH RBC QN AUTO: 23.4 PG (ref 26–34)
MCHC RBC AUTO-ENTMCNC: 31.2 G/DL (ref 30–36.5)
MCV RBC AUTO: 75 FL (ref 80–99)
MONOCYTES # BLD: 0.6 K/UL (ref 0–1)
MONOCYTES NFR BLD: 10 % (ref 5–13)
NEUTS SEG # BLD: 3.8 K/UL (ref 1.8–8)
NEUTS SEG NFR BLD: 60 % (ref 32–75)
NITRITE UR QL STRIP.AUTO: NEGATIVE
NRBC # BLD: 0 K/UL (ref 0–0.01)
NRBC BLD-RTO: 0 PER 100 WBC
PH UR STRIP: 5.5 [PH] (ref 5–8)
PH, VAGINAL FLUID: 4.5 (ref 5–6.1)
PH, VAGINAL FLUID: 7.5 (ref 5–6.1)
PLATELET # BLD AUTO: 252 K/UL (ref 150–400)
PMV BLD AUTO: 11.2 FL (ref 8.9–12.9)
PROT UR STRIP-MCNC: 30 MG/DL
RBC # BLD AUTO: 4.32 M/UL (ref 3.8–5.2)
RBC #/AREA URNS HPF: ABNORMAL /HPF (ref 0–5)
SERVICE CMNT-IMP: ABNORMAL
SP GR UR REFRACTOMETRY: >1.03 (ref 1–1.03)
UA: UC IF INDICATED,UAUC: ABNORMAL
UROBILINOGEN UR QL STRIP.AUTO: 1 EU/DL (ref 0.2–1)
WBC # BLD AUTO: 6.4 K/UL (ref 3.6–11)
WBC URNS QL MICRO: >100 /HPF (ref 0–4)

## 2018-12-15 PROCEDURE — 36415 COLL VENOUS BLD VENIPUNCTURE: CPT

## 2018-12-15 PROCEDURE — 84112 EVAL AMNIOTIC FLUID PROTEIN: CPT | Performed by: OBSTETRICS & GYNECOLOGY

## 2018-12-15 PROCEDURE — 74011250636 HC RX REV CODE- 250/636: Performed by: OBSTETRICS & GYNECOLOGY

## 2018-12-15 PROCEDURE — 74011250637 HC RX REV CODE- 250/637: Performed by: OBSTETRICS & GYNECOLOGY

## 2018-12-15 PROCEDURE — 96374 THER/PROPH/DIAG INJ IV PUSH: CPT

## 2018-12-15 PROCEDURE — 81001 URINALYSIS AUTO W/SCOPE: CPT

## 2018-12-15 PROCEDURE — 87086 URINE CULTURE/COLONY COUNT: CPT

## 2018-12-15 PROCEDURE — 99285 EMERGENCY DEPT VISIT HI MDM: CPT

## 2018-12-15 PROCEDURE — 82962 GLUCOSE BLOOD TEST: CPT

## 2018-12-15 PROCEDURE — 85025 COMPLETE CBC W/AUTO DIFF WBC: CPT

## 2018-12-15 PROCEDURE — 96360 HYDRATION IV INFUSION INIT: CPT

## 2018-12-15 PROCEDURE — 76815 OB US LIMITED FETUS(S): CPT

## 2018-12-15 PROCEDURE — 83986 ASSAY PH BODY FLUID NOS: CPT | Performed by: OBSTETRICS & GYNECOLOGY

## 2018-12-15 PROCEDURE — 96361 HYDRATE IV INFUSION ADD-ON: CPT

## 2018-12-15 PROCEDURE — 99281 EMR DPT VST MAYX REQ PHY/QHP: CPT

## 2018-12-15 RX ORDER — CEFAZOLIN SODIUM/WATER 2 G/20 ML
2 SYRINGE (ML) INTRAVENOUS
Status: COMPLETED | OUTPATIENT
Start: 2018-12-15 | End: 2018-12-15

## 2018-12-15 RX ORDER — CEFAZOLIN SODIUM/WATER 2 G/20 ML
SYRINGE (ML) INTRAVENOUS
Status: DISCONTINUED
Start: 2018-12-15 | End: 2018-12-15 | Stop reason: HOSPADM

## 2018-12-15 RX ORDER — NITROFURANTOIN 25; 75 MG/1; MG/1
100 CAPSULE ORAL EVERY 12 HOURS
Status: DISCONTINUED | OUTPATIENT
Start: 2018-12-15 | End: 2018-12-15 | Stop reason: HOSPADM

## 2018-12-15 RX ORDER — SODIUM CHLORIDE, SODIUM LACTATE, POTASSIUM CHLORIDE, CALCIUM CHLORIDE 600; 310; 30; 20 MG/100ML; MG/100ML; MG/100ML; MG/100ML
125 INJECTION, SOLUTION INTRAVENOUS CONTINUOUS
Status: DISCONTINUED | OUTPATIENT
Start: 2018-12-15 | End: 2018-12-15 | Stop reason: HOSPADM

## 2018-12-15 RX ORDER — NITROFURANTOIN 25; 75 MG/1; MG/1
100 CAPSULE ORAL EVERY 12 HOURS
Qty: 14 CAP | Refills: 0 | Status: SHIPPED | OUTPATIENT
Start: 2018-12-15 | End: 2018-12-22

## 2018-12-15 RX ORDER — SODIUM CHLORIDE 0.9 % (FLUSH) 0.9 %
SYRINGE (ML) INJECTION
Status: DISCONTINUED
Start: 2018-12-15 | End: 2018-12-15 | Stop reason: HOSPADM

## 2018-12-15 RX ADMIN — SODIUM CHLORIDE, SODIUM LACTATE, POTASSIUM CHLORIDE, AND CALCIUM CHLORIDE 999 ML/HR: 600; 310; 30; 20 INJECTION, SOLUTION INTRAVENOUS at 14:45

## 2018-12-15 RX ADMIN — NITROFURANTOIN (MONOHYDRATE/MACROCRYSTALS) 100 MG: 75; 25 CAPSULE ORAL at 16:26

## 2018-12-15 RX ADMIN — SODIUM CHLORIDE, SODIUM LACTATE, POTASSIUM CHLORIDE, AND CALCIUM CHLORIDE 125 ML/HR: 600; 310; 30; 20 INJECTION, SOLUTION INTRAVENOUS at 15:31

## 2018-12-15 RX ADMIN — Medication 2 G: at 15:52

## 2018-12-15 RX ADMIN — SODIUM CHLORIDE, SODIUM LACTATE, POTASSIUM CHLORIDE, AND CALCIUM CHLORIDE 125 ML/HR: 600; 310; 30; 20 INJECTION, SOLUTION INTRAVENOUS at 15:30

## 2018-12-15 NOTE — PROGRESS NOTES
Pt in c/o ? srom and pressure at 1000 while at work at [employer]. Pt states she has been in pain for 2 days and her OB's nurse advised her to go home and drink water when she spoke with her on the phone. Gr 8 Para 4 Ab 3 EDC 3/1/19,  29 1/7 weeks. Pt states her other babies were term and she did not have gestational diabetes. Pt is a gestational diabetic but states she does not check her blood sugar. Currently 109. Pt denies blood pressure problems. jmj    1349 Nitrazine positive. 1410 Amnisure negative. [address] Dr Choi See in to see patient. 1433 sterile speculum exam done. nitrazine redone and is negative. Amnisure redone. Cervix closed thick and high. [address] negative  Dr ordered IV fluid. And cbc. jmj    1517 Pt found standing up reaching for her book and trying to plug in her ph one  behind door. . Pt encouraged to get back in bed. Nurse plugged in phone  behind head of bed. Pt back to bed. Pt asked if she could drink her pitcher of water. Pt was given a pitcher of water to drink and given instructions approx 10 mins ago by Vergie Call Dr Choi See in to see patient. Ancef to be given for UTI. Pt to get over the counter medication for yeast.     1606 Pt discharged home.  to do discharge instructions. To go home with a macrobid prescriptions. 1638 Pt ambulatory out with discharge instructions and prescription and work note. Pt verbalized understanding to see  on Monday. Pt states she just got her drivers license this week and hasn't driven there before. Will attempt to get there. Pt ambulatory out.

## 2018-12-15 NOTE — H&P
History & Physical    Name: Shelia Lucero MRN: [id number]  SSN: [id number]    YOB: 1980  Age: 45 y.o. Sex: female        Subjective:     Estimated Date of Delivery: 3/1/19  OB History    Para Term  AB Living   1             SAB TAB Ectopic Molar Multiple Live Births                    # Outcome Date GA Lbr Khris/2nd Weight Sex Delivery Anes PTL Lv   1 Current                   Ms. El Orozco is seen for r/o ROM and pelvic/pressure felt in vagina x several days. Pregnancy at 29w1d for with h/o 4 term vaginal deliveries. Pt states she was at work and was having increased lower abdominal discomfort described as ache and radiating into vagina. She also had an episode where her panties were slightly damp several hours ago. Prenatal course was complicated by diabetes - gestational. Please see prenatal records for details. Past Medical History:   Diagnosis Date    Depression      No past surgical history on file. Social History     Occupational History    Not on file   Tobacco Use    Smoking status: Light Tobacco Smoker     Packs/day: 0.25    Smokeless tobacco: Never Used    Tobacco comment: black and mild once in awhile- NO CIGERETTES   Substance and Sexual Activity    Alcohol use: Yes     Comment: social    Drug use: No    Sexual activity: Yes     Partners: Male     Birth control/protection: None     Family History   Problem Relation Age of Onset    Diabetes Mother     Hypertension Mother     Diabetes Father     Hypertension Father      No Known Allergies  Prior to Admission medications    Medication Sig Start Date End Date Taking? Authorizing Provider   lidocaine (LIDODERM) 5 % Apply patch to the affected area for 12 hours a day and remove for 12 hours a day. 18   Martin Tatum PA-C   prenatal vit-iron fumarate-fa 27 mg iron- 0.8 mg tab tablet Take 1 Tab by mouth daily.  Indications: pregnancy 18   Courtney Dixon NP        Review of Systems   Constitutional: Negative for chills, diaphoresis, fatigue and fever. Respiratory: Negative for shortness of breath and wheezing. Cardiovascular: Negative for chest pain, palpitations and leg swelling. Gastrointestinal: Negative for blood in stool, constipation, diarrhea, nausea and vomiting. Genitourinary: Negative for difficulty urinating, dysuria, frequency, hematuria, urgency, vaginal bleeding and vaginal discharge. Musculoskeletal: Positive for back pain. Negative for arthralgias, myalgias, neck pain and neck stiffness. Skin: Negative for color change, pallor and rash. Neurological: Negative for dizziness and headaches. Psychiatric/Behavioral: Negative for agitation, behavioral problems and confusion. Objective:     Vitals:  Vitals:    12/15/18 1333 12/15/18 1344   BP:  138/86   Pulse:  (!) 101   Resp:  18   Temp:  98.2 °F (36.8 °C)   SpO2:  95%   Weight: 117 kg (258 lb)    Height: 5' 6\" (1.676 m)         Physical Exam   Constitutional: She is oriented to person, place, and time. She appears well-developed and well-nourished. No distress. HENT:   Head: Normocephalic and atraumatic. Eyes: EOM are normal. No scleral icterus. Neck: Normal range of motion. Neck supple. No JVD present. No tracheal deviation present. No thyromegaly present. Cardiovascular: Normal rate, regular rhythm and normal heart sounds. Exam reveals no gallop and no friction rub. No murmur heard. Pulmonary/Chest: Effort normal and breath sounds normal. No respiratory distress. She has no wheezes. She has no rales. Abdominal: Soft. She exhibits no distension. There is no tenderness. There is no rebound and no guarding. Genitourinary: Vaginal discharge found. Genitourinary Comments: Curdly white discharge, +KOH   Musculoskeletal: She exhibits no edema, tenderness or deformity. Lymphadenopathy:     She has no cervical adenopathy. Neurological: She is alert and oriented to person, place, and time. She has normal reflexes.  She displays normal reflexes. She exhibits normal muscle tone. Skin: Skin is warm and dry. No rash noted. No erythema. No pallor. Psychiatric: She has a normal mood and affect. Her behavior is normal. Judgment and thought content normal.     U/S --ROHAN =17.2 , breech, anterior normal placenta,no previa  Cervical Exam: Closed/Thick/High  Uterine Activity: irregular contractions  Membranes: Intact,-nitrazine,pooling,ferning, and amnisure x 2  Fetal Heart Rate: good variability for 29 weeks. Cat 1     Prenatal Labs:   No results found for: ABORH, RUBELLAEXT, GRBSEXT, HBSAGEXT, HIVEXT, RPREXT, GONNOEXT, CHLAMEXT, ABORHEXT, RUBELLAEXT, GRBSEXT, HBSAGEXT, HIVEXT, RPREXT, GONNOEXT, CHLAMEXT       Impression/Plan:      1)No evidence of ROM  2)PT contractions  3)Yeast infection      Plan: IVF, CBC, U/A,will treat for yeast vaginitis,observe for response with IVFs.     Signed By:  Talib Harris MD     December 15, 2018

## 2018-12-15 NOTE — PROGRESS NOTES
Work Note:  Patient was evaluated at [id number] [place] [address] for complications of pregnancy. She should not work until 12/18/18. She needs rest and will have a follow up visit on Monday the 18th.     Tony Soto M.D.

## 2018-12-15 NOTE — DISCHARGE INSTRUCTIONS
Return to 's office on Monday. Pt will need to call for an apt.  Labor: Care Instructions  Your Care Instructions     labor is the start of labor between 21 and 36 weeks of pregnancy. A full-term pregnancy lasts 37 to 42 weeks. In labor, the uterus contracts to open the cervix. This is the first stage of childbirth.  labor can be caused by a problem with the baby, the mother, or both. Often the cause is not known. In some cases, doctors use medicines to try to delay labor until 29 or more weeks of pregnancy. By this time, a baby has grown enough so that problems are not likely. In some cases--such as with a serious infection--it is healthier for the baby to be born early. Your treatment will depend on how far along you are in your pregnancy and on your health and your baby's health. Follow-up care is a key part of your treatment and safety. Be sure to make and go to all appointments, and call your doctor if you are having problems. It's also a good idea to know your test results and keep a list of the medicines you take. How can you care for yourself at home? · If your doctor prescribed medicines, take them exactly as directed. Call your doctor if you think you are having a problem with your medicine. · Rest until your doctor advises you about activity. He or she will tell you if you should stay in bed most of the time. You may need to arrange for  if you have young children. · Do not have sexual intercourse unless your doctor says it is safe. · Use pads, not tampons, if you have vaginal bleeding. · Make sure to drink plenty of fluids. Dehydration can lead to contractions. If you have kidney, heart, or liver disease and have to limit fluids, talk with your doctor before you increase the amount of fluids you drink. · Do not smoke or allow others to smoke around you. If you need help quitting, talk to your doctor about stop-smoking programs and medicines.  These can increase your chances of quitting for good. When should you call for help? Call 911 anytime you think you may need emergency care. For example, call if:    · You passed out (lost consciousness).     · You have severe vaginal bleeding.     · You have severe pain in your belly or pelvis.     · You have had fluid gushing or leaking from your vagina and you know or think the umbilical cord is bulging into your vagina. If this happens, immediately get down on your knees so your rear end (buttocks) is higher than your head. This will decrease the pressure on the cord until help arrives.   [facility] your doctor now or seek immediate medical care if:    · You have signs of preeclampsia, such as:  ? Sudden swelling of your face, hands, or feet. ? New vision problems (such as dimness or blurring). ? A severe headache.     · You have any vaginal bleeding.     · You have belly pain or cramping.     · You have a fever.     · You have had regular contractions (with or without pain) for an hour. This means that you have 6 or more within 1 hour after you change your position and drink fluids.     · You have a sudden release of fluid from the vagina.     · You have low back pain or pelvic pressure that does not go away.     · You notice that your baby has stopped moving or is moving much less than normal.    Watch closely for changes in your health, and be sure to contact your doctor if you have any problems. Where can you learn more? Go to http://daniel-winston.info/. Enter Q400 in the search box to learn more about \" Labor: Care Instructions. \"  Current as of: 2017  Content Version: 11.8  © 3798-7196 [employer]. Care instructions adapted under license by [employer] (which disclaims liability or warranty for this information).  If you have questions about a medical condition or this instruction, always ask your healthcare professional. Chauncey Ricketts disclaims any warranty or liability for your use of this information. Gynelotromin 7 over the counter for a yeast infection.

## 2018-12-17 LAB
BACTERIA SPEC CULT: NORMAL
CC UR VC: NORMAL
SERVICE CMNT-IMP: NORMAL

## 2019-01-31 LAB — GRBS, EXTERNAL: NEGATIVE

## 2019-02-27 ENCOUNTER — ANESTHESIA (OUTPATIENT)
Dept: LABOR AND DELIVERY | Age: 39
DRG: 560 | End: 2019-02-27
Payer: MEDICAID

## 2019-02-27 ENCOUNTER — HOSPITAL ENCOUNTER (INPATIENT)
Age: 39
LOS: 2 days | Discharge: HOME | DRG: 560 | End: 2019-03-01
Attending: OBSTETRICS & GYNECOLOGY | Admitting: OBSTETRICS & GYNECOLOGY
Payer: MEDICAID

## 2019-02-27 ENCOUNTER — ANESTHESIA EVENT (OUTPATIENT)
Dept: LABOR AND DELIVERY | Age: 39
DRG: 560 | End: 2019-02-27
Payer: MEDICAID

## 2019-02-27 DIAGNOSIS — Z3A.39 39 WEEKS GESTATION OF PREGNANCY: Primary | ICD-10-CM

## 2019-02-27 PROBLEM — O26.899 ABDOMINAL PAIN AFFECTING PREGNANCY: Status: ACTIVE | Noted: 2019-02-27

## 2019-02-27 PROBLEM — R10.9 ABDOMINAL PAIN AFFECTING PREGNANCY: Status: ACTIVE | Noted: 2019-02-27

## 2019-02-27 LAB
ALBUMIN SERPL-MCNC: 2.5 G/DL (ref 3.5–5)
ALBUMIN/GLOB SERPL: 0.7 {RATIO} (ref 1.1–2.2)
ALP SERPL-CCNC: 208 U/L (ref 45–117)
ALT SERPL-CCNC: 13 U/L (ref 12–78)
ANION GAP SERPL CALC-SCNC: 12 MMOL/L (ref 5–15)
AST SERPL-CCNC: 17 U/L (ref 15–37)
BASOPHILS # BLD: 0 K/UL (ref 0–0.1)
BASOPHILS NFR BLD: 0 % (ref 0–1)
BILIRUB SERPL-MCNC: 0.4 MG/DL (ref 0.2–1)
BUN SERPL-MCNC: 7 MG/DL (ref 6–20)
BUN/CREAT SERPL: 9 (ref 12–20)
CALCIUM SERPL-MCNC: 8.4 MG/DL (ref 8.5–10.1)
CHLORIDE SERPL-SCNC: 106 MMOL/L (ref 97–108)
CO2 SERPL-SCNC: 19 MMOL/L (ref 21–32)
CREAT SERPL-MCNC: 0.75 MG/DL (ref 0.55–1.02)
DIFFERENTIAL METHOD BLD: ABNORMAL
EOSINOPHIL # BLD: 0 K/UL (ref 0–0.4)
EOSINOPHIL NFR BLD: 0 % (ref 0–7)
ERYTHROCYTE [DISTWIDTH] IN BLOOD BY AUTOMATED COUNT: 14.3 % (ref 11.5–14.5)
GLOBULIN SER CALC-MCNC: 3.7 G/DL (ref 2–4)
GLUCOSE BLD STRIP.AUTO-MCNC: 102 MG/DL (ref 65–100)
GLUCOSE BLD STRIP.AUTO-MCNC: 107 MG/DL (ref 65–100)
GLUCOSE BLD STRIP.AUTO-MCNC: 118 MG/DL (ref 65–100)
GLUCOSE BLD STRIP.AUTO-MCNC: 170 MG/DL (ref 65–100)
GLUCOSE BLD STRIP.AUTO-MCNC: 91 MG/DL (ref 65–100)
GLUCOSE SERPL-MCNC: 116 MG/DL (ref 65–100)
HCT VFR BLD AUTO: 34.2 % (ref 35–47)
HGB BLD-MCNC: 10.1 G/DL (ref 11.5–16)
IMM GRANULOCYTES # BLD AUTO: 0 K/UL (ref 0–0.04)
IMM GRANULOCYTES NFR BLD AUTO: 0 % (ref 0–0.5)
LYMPHOCYTES # BLD: 2 K/UL (ref 0.8–3.5)
LYMPHOCYTES NFR BLD: 41 % (ref 12–49)
MCH RBC QN AUTO: 21.6 PG (ref 26–34)
MCHC RBC AUTO-ENTMCNC: 29.5 G/DL (ref 30–36.5)
MCV RBC AUTO: 73.1 FL (ref 80–99)
MONOCYTES # BLD: 0.5 K/UL (ref 0–1)
MONOCYTES NFR BLD: 9 % (ref 5–13)
NEUTS SEG # BLD: 2.5 K/UL (ref 1.8–8)
NEUTS SEG NFR BLD: 49 % (ref 32–75)
NRBC # BLD: 0 K/UL (ref 0–0.01)
NRBC BLD-RTO: 0 PER 100 WBC
PLATELET # BLD AUTO: 253 K/UL (ref 150–400)
PMV BLD AUTO: 11.7 FL (ref 8.9–12.9)
POTASSIUM SERPL-SCNC: 3.8 MMOL/L (ref 3.5–5.1)
PROT SERPL-MCNC: 6.2 G/DL (ref 6.4–8.2)
RBC # BLD AUTO: 4.68 M/UL (ref 3.8–5.2)
SERVICE CMNT-IMP: ABNORMAL
SERVICE CMNT-IMP: NORMAL
SODIUM SERPL-SCNC: 137 MMOL/L (ref 136–145)
WBC # BLD AUTO: 5 K/UL (ref 3.6–11)

## 2019-02-27 PROCEDURE — 75410000000 HC DELIVERY VAGINAL/SINGLE

## 2019-02-27 PROCEDURE — 76060000078 HC EPIDURAL ANESTHESIA

## 2019-02-27 PROCEDURE — 74011000250 HC RX REV CODE- 250

## 2019-02-27 PROCEDURE — 74011250637 HC RX REV CODE- 250/637: Performed by: OBSTETRICS & GYNECOLOGY

## 2019-02-27 PROCEDURE — 82962 GLUCOSE BLOOD TEST: CPT

## 2019-02-27 PROCEDURE — 74011250636 HC RX REV CODE- 250/636: Performed by: ADVANCED PRACTICE MIDWIFE

## 2019-02-27 PROCEDURE — 77030014125 HC TY EPDRL BBMI -B: Performed by: ANESTHESIOLOGY

## 2019-02-27 PROCEDURE — 74011250636 HC RX REV CODE- 250/636

## 2019-02-27 PROCEDURE — 75410000003 HC RECOV DEL/VAG/CSECN EA 0.5 HR

## 2019-02-27 PROCEDURE — 77030003666 HC NDL SPINAL BD -A: Performed by: ANESTHESIOLOGY

## 2019-02-27 PROCEDURE — 74011636637 HC RX REV CODE- 636/637: Performed by: ADVANCED PRACTICE MIDWIFE

## 2019-02-27 PROCEDURE — 74011000250 HC RX REV CODE- 250: Performed by: ANESTHESIOLOGY

## 2019-02-27 PROCEDURE — 99283 EMERGENCY DEPT VISIT LOW MDM: CPT

## 2019-02-27 PROCEDURE — 36415 COLL VENOUS BLD VENIPUNCTURE: CPT

## 2019-02-27 PROCEDURE — 85025 COMPLETE CBC W/AUTO DIFF WBC: CPT

## 2019-02-27 PROCEDURE — 80053 COMPREHEN METABOLIC PANEL: CPT

## 2019-02-27 PROCEDURE — 75410000002 HC LABOR FEE PER 1 HR

## 2019-02-27 PROCEDURE — A4300 CATH IMPL VASC ACCESS PORTAL: HCPCS

## 2019-02-27 PROCEDURE — 65410000002 HC RM PRIVATE OB

## 2019-02-27 PROCEDURE — 77030011943

## 2019-02-27 RX ORDER — ONDANSETRON 2 MG/ML
4 INJECTION INTRAMUSCULAR; INTRAVENOUS
Status: DISCONTINUED | OUTPATIENT
Start: 2019-02-27 | End: 2019-03-01 | Stop reason: HOSPADM

## 2019-02-27 RX ORDER — MAGNESIUM SULFATE 100 %
4 CRYSTALS MISCELLANEOUS AS NEEDED
Status: DISCONTINUED | OUTPATIENT
Start: 2019-02-27 | End: 2019-03-01 | Stop reason: HOSPADM

## 2019-02-27 RX ORDER — DEXTROSE 50 % IN WATER (D50W) INTRAVENOUS SYRINGE
12.5-25 AS NEEDED
Status: DISCONTINUED | OUTPATIENT
Start: 2019-02-27 | End: 2019-03-01 | Stop reason: HOSPADM

## 2019-02-27 RX ORDER — FENTANYL CITRATE 50 UG/ML
INJECTION, SOLUTION INTRAMUSCULAR; INTRAVENOUS AS NEEDED
Status: DISCONTINUED | OUTPATIENT
Start: 2019-02-27 | End: 2019-03-05 | Stop reason: HOSPADM

## 2019-02-27 RX ORDER — LIDOCAINE HYDROCHLORIDE AND EPINEPHRINE 15; 5 MG/ML; UG/ML
INJECTION, SOLUTION EPIDURAL AS NEEDED
Status: DISCONTINUED | OUTPATIENT
Start: 2019-02-27 | End: 2019-03-05 | Stop reason: HOSPADM

## 2019-02-27 RX ORDER — FENTANYL CITRATE 50 UG/ML
100 INJECTION, SOLUTION INTRAMUSCULAR; INTRAVENOUS ONCE
Status: COMPLETED | OUTPATIENT
Start: 2019-02-27 | End: 2019-02-27

## 2019-02-27 RX ORDER — OXYCODONE AND ACETAMINOPHEN 5; 325 MG/1; MG/1
1 TABLET ORAL
Status: DISCONTINUED | OUTPATIENT
Start: 2019-02-27 | End: 2019-03-01 | Stop reason: HOSPADM

## 2019-02-27 RX ORDER — EPHEDRINE SULFATE 50 MG/ML
10 INJECTION, SOLUTION INTRAVENOUS
Status: COMPLETED | OUTPATIENT
Start: 2019-02-27 | End: 2019-02-27

## 2019-02-27 RX ORDER — PHENYLEPHRINE HYDROCHLORIDE 10 MG/ML
INJECTION INTRAVENOUS
Status: DISCONTINUED
Start: 2019-02-27 | End: 2019-02-27 | Stop reason: WASHOUT

## 2019-02-27 RX ORDER — NALOXONE HYDROCHLORIDE 0.4 MG/ML
0.4 INJECTION, SOLUTION INTRAMUSCULAR; INTRAVENOUS; SUBCUTANEOUS AS NEEDED
Status: DISCONTINUED | OUTPATIENT
Start: 2019-02-27 | End: 2019-02-27 | Stop reason: HOSPADM

## 2019-02-27 RX ORDER — SODIUM CHLORIDE 0.9 % (FLUSH) 0.9 %
5-40 SYRINGE (ML) INJECTION AS NEEDED
Status: DISCONTINUED | OUTPATIENT
Start: 2019-02-27 | End: 2019-03-01 | Stop reason: HOSPADM

## 2019-02-27 RX ORDER — DOCUSATE SODIUM 100 MG/1
100 CAPSULE, LIQUID FILLED ORAL
Status: DISCONTINUED | OUTPATIENT
Start: 2019-02-27 | End: 2019-03-01 | Stop reason: HOSPADM

## 2019-02-27 RX ORDER — HYDROCORTISONE 1 %
CREAM (GRAM) TOPICAL AS NEEDED
Status: DISCONTINUED | OUTPATIENT
Start: 2019-02-27 | End: 2019-03-01 | Stop reason: HOSPADM

## 2019-02-27 RX ORDER — SODIUM CHLORIDE 0.9 % (FLUSH) 0.9 %
5-40 SYRINGE (ML) INJECTION EVERY 8 HOURS
Status: DISCONTINUED | OUTPATIENT
Start: 2019-02-27 | End: 2019-03-01 | Stop reason: HOSPADM

## 2019-02-27 RX ORDER — EPHEDRINE SULFATE 50 MG/ML
INJECTION, SOLUTION INTRAVENOUS
Status: COMPLETED
Start: 2019-02-27 | End: 2019-02-27

## 2019-02-27 RX ORDER — BUPIVACAINE HYDROCHLORIDE 5 MG/ML
INJECTION, SOLUTION EPIDURAL; INTRACAUDAL AS NEEDED
Status: DISCONTINUED | OUTPATIENT
Start: 2019-02-27 | End: 2019-03-05 | Stop reason: HOSPADM

## 2019-02-27 RX ORDER — PHENYLEPHRINE 10 MG/250 ML(40 MCG/ML)IN 0.9 % SOD.CHLORIDE INTRAVENOUS
Status: DISCONTINUED
Start: 2019-02-27 | End: 2019-02-27 | Stop reason: WASHOUT

## 2019-02-27 RX ORDER — FENTANYL/BUPIVACAINE/NS/PF 2-1250MCG
1-16 PREFILLED PUMP RESERVOIR EPIDURAL CONTINUOUS
Status: DISCONTINUED | OUTPATIENT
Start: 2019-02-27 | End: 2019-03-01 | Stop reason: HOSPADM

## 2019-02-27 RX ORDER — NALBUPHINE HYDROCHLORIDE 10 MG/ML
INJECTION, SOLUTION INTRAMUSCULAR; INTRAVENOUS; SUBCUTANEOUS
Status: COMPLETED
Start: 2019-02-27 | End: 2019-02-27

## 2019-02-27 RX ORDER — FENTANYL CITRATE 50 UG/ML
INJECTION, SOLUTION INTRAMUSCULAR; INTRAVENOUS
Status: COMPLETED
Start: 2019-02-27 | End: 2019-02-27

## 2019-02-27 RX ORDER — BUPIVACAINE HYDROCHLORIDE 2.5 MG/ML
INJECTION, SOLUTION EPIDURAL; INFILTRATION; INTRACAUDAL
Status: DISPENSED
Start: 2019-02-27 | End: 2019-02-28

## 2019-02-27 RX ORDER — OXYTOCIN/0.9 % SODIUM CHLORIDE 30/500 ML
0-25 PLASTIC BAG, INJECTION (ML) INTRAVENOUS
Status: DISCONTINUED | OUTPATIENT
Start: 2019-02-27 | End: 2019-02-27 | Stop reason: HOSPADM

## 2019-02-27 RX ORDER — AMMONIA 15 % (W/V)
1 AMPUL (EA) INHALATION AS NEEDED
Status: DISCONTINUED | OUTPATIENT
Start: 2019-02-27 | End: 2019-03-01 | Stop reason: HOSPADM

## 2019-02-27 RX ORDER — OXYTOCIN/RINGER'S LACTATE 20/1000 ML
999 PLASTIC BAG, INJECTION (ML) INTRAVENOUS AS NEEDED
Status: DISCONTINUED | OUTPATIENT
Start: 2019-02-27 | End: 2019-03-01 | Stop reason: HOSPADM

## 2019-02-27 RX ORDER — METFORMIN HYDROCHLORIDE 1000 MG/1
500 TABLET ORAL 2 TIMES DAILY
COMMUNITY
End: 2019-12-16

## 2019-02-27 RX ORDER — BUPIVACAINE HYDROCHLORIDE 5 MG/ML
INJECTION, SOLUTION EPIDURAL; INTRACAUDAL
Status: COMPLETED
Start: 2019-02-27 | End: 2019-02-27

## 2019-02-27 RX ORDER — FENTANYL/BUPIVACAINE/NS/PF 2-1250MCG
PREFILLED PUMP RESERVOIR EPIDURAL
Status: COMPLETED
Start: 2019-02-27 | End: 2019-02-27

## 2019-02-27 RX ORDER — SODIUM CHLORIDE, SODIUM LACTATE, POTASSIUM CHLORIDE, CALCIUM CHLORIDE 600; 310; 30; 20 MG/100ML; MG/100ML; MG/100ML; MG/100ML
125 INJECTION, SOLUTION INTRAVENOUS CONTINUOUS
Status: DISCONTINUED | OUTPATIENT
Start: 2019-02-27 | End: 2019-03-01 | Stop reason: HOSPADM

## 2019-02-27 RX ORDER — HYDROCORTISONE ACETATE PRAMOXINE HCL 2.5; 1 G/100G; G/100G
CREAM TOPICAL AS NEEDED
Status: DISCONTINUED | OUTPATIENT
Start: 2019-02-27 | End: 2019-03-01 | Stop reason: HOSPADM

## 2019-02-27 RX ORDER — IBUPROFEN 400 MG/1
800 TABLET ORAL EVERY 8 HOURS
Status: DISCONTINUED | OUTPATIENT
Start: 2019-02-27 | End: 2019-03-01 | Stop reason: HOSPADM

## 2019-02-27 RX ORDER — FENTANYL CITRATE 50 UG/ML
INJECTION, SOLUTION INTRAMUSCULAR; INTRAVENOUS
Status: DISPENSED
Start: 2019-02-27 | End: 2019-02-28

## 2019-02-27 RX ORDER — DIPHENHYDRAMINE HYDROCHLORIDE 50 MG/ML
12.5 INJECTION, SOLUTION INTRAMUSCULAR; INTRAVENOUS
Status: DISCONTINUED | OUTPATIENT
Start: 2019-02-27 | End: 2019-03-01 | Stop reason: HOSPADM

## 2019-02-27 RX ORDER — MAG HYDROX/ALUMINUM HYD/SIMETH 200-200-20
30 SUSPENSION, ORAL (FINAL DOSE FORM) ORAL
Status: DISCONTINUED | OUTPATIENT
Start: 2019-02-27 | End: 2019-02-27 | Stop reason: HOSPADM

## 2019-02-27 RX ORDER — SIMETHICONE 80 MG
80 TABLET,CHEWABLE ORAL
Status: DISCONTINUED | OUTPATIENT
Start: 2019-02-27 | End: 2019-03-01 | Stop reason: HOSPADM

## 2019-02-27 RX ORDER — OXYTOCIN/0.9 % SODIUM CHLORIDE 30/500 ML
PLASTIC BAG, INJECTION (ML) INTRAVENOUS
Status: COMPLETED
Start: 2019-02-27 | End: 2019-02-27

## 2019-02-27 RX ORDER — EPHEDRINE SULFATE 50 MG/ML
25 INJECTION, SOLUTION INTRAVENOUS
Status: COMPLETED | OUTPATIENT
Start: 2019-02-27 | End: 2019-02-27

## 2019-02-27 RX ORDER — BUPIVACAINE HYDROCHLORIDE 5 MG/ML
30 INJECTION, SOLUTION EPIDURAL; INTRACAUDAL AS NEEDED
Status: DISCONTINUED | OUTPATIENT
Start: 2019-02-27 | End: 2019-02-27 | Stop reason: HOSPADM

## 2019-02-27 RX ORDER — OXYTOCIN/RINGER'S LACTATE 20/1000 ML
125 PLASTIC BAG, INJECTION (ML) INTRAVENOUS AS NEEDED
Status: DISCONTINUED | OUTPATIENT
Start: 2019-02-27 | End: 2019-03-01 | Stop reason: HOSPADM

## 2019-02-27 RX ORDER — LIDOCAINE HYDROCHLORIDE 10 MG/ML
10 INJECTION INFILTRATION; PERINEURAL AS NEEDED
Status: DISCONTINUED | OUTPATIENT
Start: 2019-02-27 | End: 2019-02-27 | Stop reason: HOSPADM

## 2019-02-27 RX ADMIN — FENTANYL CITRATE 100 MCG: 50 INJECTION, SOLUTION INTRAMUSCULAR; INTRAVENOUS at 05:16

## 2019-02-27 RX ADMIN — EPHEDRINE SULFATE 10 MG: 50 INJECTION, SOLUTION INTRAVENOUS at 06:32

## 2019-02-27 RX ADMIN — NALBUPHINE HYDROCHLORIDE 10 MG: 10 INJECTION, SOLUTION INTRAMUSCULAR; INTRAVENOUS; SUBCUTANEOUS at 04:21

## 2019-02-27 RX ADMIN — OXYTOCIN-SODIUM CHLORIDE 0.9% IV SOLN 30 UNIT/500ML 1 MILLI-UNITS/MIN: 30-0.9/5 SOLUTION at 11:59

## 2019-02-27 RX ADMIN — BUPIVACAINE HYDROCHLORIDE 2 ML: 5 INJECTION, SOLUTION EPIDURAL; INTRACAUDAL at 05:15

## 2019-02-27 RX ADMIN — BUPIVACAINE HYDROCHLORIDE 1 ML: 5 INJECTION, SOLUTION EPIDURAL; INTRACAUDAL at 05:14

## 2019-02-27 RX ADMIN — Medication 10 ML/HR: at 05:22

## 2019-02-27 RX ADMIN — Medication 8 ML/HR: at 06:55

## 2019-02-27 RX ADMIN — OXYCODONE AND ACETAMINOPHEN 1 TABLET: 5; 325 TABLET ORAL at 19:13

## 2019-02-27 RX ADMIN — Medication 8 ML/HR: at 15:27

## 2019-02-27 RX ADMIN — LIDOCAINE HYDROCHLORIDE AND EPINEPHRINE 2 ML: 15; 5 INJECTION, SOLUTION EPIDURAL at 05:12

## 2019-02-27 RX ADMIN — IBUPROFEN 800 MG: 400 TABLET ORAL at 17:17

## 2019-02-27 RX ADMIN — HUMAN INSULIN 2 UNITS: 100 INJECTION, SOLUTION SUBCUTANEOUS at 06:30

## 2019-02-27 RX ADMIN — Medication 1 MILLI-UNITS/MIN: at 11:59

## 2019-02-27 RX ADMIN — FENTANYL CITRATE 50 MCG: 50 INJECTION, SOLUTION INTRAMUSCULAR; INTRAVENOUS at 05:17

## 2019-02-27 RX ADMIN — FENTANYL CITRATE 50 MCG: 50 INJECTION, SOLUTION INTRAMUSCULAR; INTRAVENOUS at 05:16

## 2019-02-27 RX ADMIN — Medication 10 ML: at 22:55

## 2019-02-27 RX ADMIN — EPHEDRINE SULFATE 10 MG: 50 INJECTION, SOLUTION INTRAVENOUS at 05:52

## 2019-02-27 RX ADMIN — LIDOCAINE HYDROCHLORIDE AND EPINEPHRINE 2 ML: 15; 5 INJECTION, SOLUTION EPIDURAL at 05:13

## 2019-02-27 RX ADMIN — SODIUM CHLORIDE, SODIUM LACTATE, POTASSIUM CHLORIDE, AND CALCIUM CHLORIDE 125 ML/HR: 600; 310; 30; 20 INJECTION, SOLUTION INTRAVENOUS at 04:10

## 2019-02-27 RX ADMIN — EPHEDRINE SULFATE 25 MG: 50 INJECTION, SOLUTION INTRAVENOUS at 06:41

## 2019-02-27 RX ADMIN — OXYCODONE AND ACETAMINOPHEN 1 TABLET: 5; 325 TABLET ORAL at 23:15

## 2019-02-27 NOTE — L&D DELIVERY NOTE
Delivery Summary  Patient: Rea Du             Circumcision:   NA-female \"Ivyanna\"  Additional Delivery Comments - pt pushed to deliver head in ELIZABETH position, anterior shoulder and rest of body followed with gentle downward traction. Baby to mom's chest. Cord clamped and cut by pt's daughter. Placenta delivered (by medical student), spontaneous, intact, 3VC. No lacerations. Information for the patient's :  Merlin Fields [[id number]]       Labor Events:    Labor: No   Rupture Date: 2019   Rupture Time: 10:00 AM   Rupture Type SROM   Amniotic Fluid Volume: Moderate    Amniotic Fluid Description: Clear None   Induction: None       Augmentation: Oxytocin   Labor Events: None     Cervical Ripening:     None     Delivery Events:  Episiotomy:     Laceration(s):       Repaired:      Number of Repair Packets:     Suture Type and Size:       Estimated Blood Loss (ml):  ml       Delivery Date: 2019    Delivery Time: 3:44 PM  Delivery Type: Vaginal, Spontaneous  Sex:  Female     Gestational Age: 38w11d   Delivery Clinician:  Cynthia Vanegas  Living Status: Living   Delivery Location: L&D  5          APGARS  One minute Five minutes Ten minutes   Skin color: 1   2        Heart rate: 2   2        Grimace: 2   2        Muscle tone: 2   2        Breathin   2        Totals: 9   10            Presentation: Vertex    Position:        Resuscitation Method:  Tactile Stimulation;Suctioning-bulb     Meconium Stained: None      Cord Information:    Complications: None  Cord around:    Delayed cord clamping? Yes  Cord clamped date/time:   Disposition of Cord Blood: Lab    Blood Gases Sent?: No    Placenta:  Date/Time: 2019  3:58 PM  Removal:        Appearance:        Measurements:  Birth Weight:        Birth Length:        Head Circumference:        Chest Circumference:       Abdominal Girth:       Other Providers:   ELIEL LOWE;ANKUR JOSHI;DOYLE, [facility]st[place];Primary Nurse;Primary  Nurse           Cord pH:  none    Episiotomy:   no  Laceration(s):     none  Estimated Blood Loss (ml): 100mL    Labor Events  Method: None      Augmentation: Oxytocin   Cervical Ripening:       None        Hospital Problems  Date Reviewed: 2019          Codes Class Noted POA    39 weeks gestation of pregnancy ICD-10-CM: Z3A.39  ICD-9-CM: V22.2  2019 Unknown        Abdominal pain affecting pregnancy ICD-10-CM: O26.899, R10.9  ICD-9-CM: 646.80, 789.00  2019 Unknown              Operative Vaginal Delivery - none    Group B Strep:   Lab Results   Component Value Date/Time    GrBStrep, External negative 2019     Information for the patient's :  Jasen Borrego [[id number]]   No results found for: ABORH, PCTABR, PCTDIG, BILI, ABORHEXT, ABORH    No results found for: APH, APCO2, APO2, AHCO3, ABEC, ABDC, O2ST, EPHV, PCO2V, PO2V, HCO3V, EBEV, EBDV, SITE, RSCOM

## 2019-02-27 NOTE — ANESTHESIA PREPROCEDURE EVALUATION
Anesthetic History No history of anesthetic complications Review of Systems / Medical History Patient summary reviewed, nursing notes reviewed and pertinent labs reviewed Pulmonary Within defined limits Neuro/Psych Within defined limits Cardiovascular Within defined limits Exercise tolerance: >4 METS 
  
GI/Hepatic/Renal 
Within defined limits Endo/Other Diabetes Obesity Other Findings Comments: Bola Cm Physical Exam 
 
Airway Mallampati: II 
TM Distance: > 6 cm Neck ROM: normal range of motion Mouth opening: Normal 
 
 Cardiovascular Regular rate and rhythm,  S1 and S2 normal,  no murmur, click, rub, or gallop Dental 
No notable dental hx Pulmonary Breath sounds clear to auscultation Abdominal 
GI exam deferred Other Findings Anesthetic Plan ASA: 3 Anesthesia type: epidural 
 
 
Post-op pain plan if not by surgeon: indwelling epidural catheter Induction: Intravenous Anesthetic plan and risks discussed with: Patient

## 2019-02-27 NOTE — PROGRESS NOTES
Labor Progress Note Patient seen, fetal heart rate and contraction pattern evaluated. Pt has been laboring down due to inability to push effectively VSS- mild one severe resovled, pitocin @ NA Fetal Heart Rate: moderate variability Accelerations: yes Decelerations: variable, late decels x3 resolve Uterine Activity: q3min Cervical Exam: FD/+1 Membranes:  ROM Assessment/Plan: Pt pushed w some descent so will start actively pushing again. HTN - stable GDM- 91, 102

## 2019-02-27 NOTE — LACTATION NOTE
1 Discussed with mother her plan for feeding her baby. Reviewed the benefits and management of exclusive breast milk feeding as well as the importance of latching within the first hour after delivery. Instruction provided on how to recognize hunger cues and initiate breast feeding in response to those cues. Patient confirms breast milk and formula feed as intended feeding method at this time.

## 2019-02-27 NOTE — ANESTHESIA PROCEDURE NOTES
Epidural Block Start time: 2/27/2019 5:01 AM 
End time: 2/27/2019 5:17 AM 
Performed by: Kenyetta Longo MD 
Authorized by: Kenyetta Longo MD  
 
Pre-Procedure Indication: labor epidural   
Preanesthetic Checklist: patient identified, risks and benefits discussed, anesthesia consent, site marked, patient being monitored, timeout performed and anesthesia consent Timeout Time: 05:01 Epidural:  
Patient position:  Seated Prep region:  Lumbar Prep: Chlorhexidine Location:  L3-4 Needle and Epidural Catheter:  
Needle Type:  Tuohy Needle Gauge:  17 G Injection Technique:  Loss of resistance using air Attempts:  3 Catheter Size:  19 G Events: no blood with aspiration, no cerebrospinal fluid with aspiration, no paresthesia and negative aspiration test   
Test Dose:  Bupivacaine 0.25% and negative Assessment:  
Catheter Secured:  Tegaderm and tape Insertion:  Uncomplicated Patient tolerance:  Patient tolerated the procedure well with no immediate complications

## 2019-02-27 NOTE — PROGRESS NOTES
Labor Progress Note Patient seen, fetal heart rate and contraction pattern evaluated. Pt comfortable w epidural, increased pelvic pressure. CTSP for difficulty monitoring. VSS0- normal to mild range, pitocin @ NA Fetal Heart Rate: moderate variability Accelerations: yes Decelerations: none Uterine Activity: Irregular Cervical Exam: 7/90/-3 Membranes:  SROM Assessment/Plan: BSUS done for FHT, which was still difficult to monitor Vertex by BSUS 
SROM during digital check, copious clear fluid Head came down to cervix FSE placed with good FHT 
 
GDM- 170 (s/p 2U insulin), , 107 CHTN - normal/mild BPs

## 2019-02-27 NOTE — PROGRESS NOTES
0770: Bedside and Verbal shift change report given to OLAYINKA Vernon RN (oncoming nurse) by GALE Urena RN (offgoing nurse). Report included the following information SBAR, Intake/Output and MAR.  
 
0825: Straight cath for 200 cc.  
0935: 2nd RN at bedside to assist finding FHR.  
0946: Dr. Robin Fang requested at bedside. 1184: Doppler HR is 130's.  
0955: Dr. Robin Fang at bedside. U/S done. Vertex position confirmed. 1000: SROM. SVE 7-8/90/-3. 
1001: FSE placed. 1130: SVE unchanged, start Pitocin per Dr. Robin Fang. 1150: Unable to straight cath patient as she says she can feel the catheter. Will use bolus button x 2 to see if it helps. 1159: Pitocin started. 1200: Straight cath for 300 cc. 1241: SVE 10/100/0.  
1249: Start pushing. 1320: Stop pushing. Patient will labor down. 1333: Dr. Melinda Waters at bedside to dose epidural.  
1413: Spinning babies. 1425: Peanutball, T-lowe. 1441: Start pushing. 1457: Dr. Robin Fang at bedside. FSE replaced at this time. 1540: Bedside and Verbal shift change report given to OLAYINKA Calhoun RN (oncoming nurse) by OLAYINKA Vernon RN (offgoing nurse). Report included the following information SBAR, Intake/Output and MAR.

## 2019-02-27 NOTE — PROGRESS NOTES
0330 Pt arrived with complaint of contractions service of Dr Blas Wheatley [address] in room assessing strip and talking with pt. sve done orders to admit. 2675 Dr Xavier Rendon in room to place epidural pt on left side. 2941 epidual placed by Dr Xavier Rendon  
 
0600 complain of nausea experiencing some hypotension 6316 Dr Xavier Rendon notified of blood pressures 8454 medicated for hypotension orders received. 
 
7388 Dr Carina Mckeon in room to assess pt blood pressure see anesthesia record. 0730 Resting anxious about blood pressures 0509 Bedside shift change report given to OLAYINKA Strange (oncoming nurse) by Heather King Rn (offgoing nurse). Report included the following information SBAR, MAR and Med Rec Status.

## 2019-02-27 NOTE — PROGRESS NOTES
1540 Bedside and Verbal shift change report given to Abner Sandra  (oncoming nurse) by Uri Ceballos RN (offgoing nurse). Report included the following information SBAR, Intake/Output, MAR and Recent Results 53-69-10-18  of liveborn male by Dr. Blaine Dey. 1755 [facility] called and notified of pending transfer in 30 minutes 5087 Lab work drawn and sent to lab [address] [facility] [place] REPORT: 
 
Verbal report given to Jorgito Bella RN (name) on [facility]la [facility]  being transferred to [facility]U (unit) for routine progression of care Report consisted of patients Situation, Background, Assessment and  
Recommendations(SBAR). Information from the following report(s) Procedure Summary, Intake/Output, MAR and Recent Results was reviewed with the receiving nurse. Lines:  
Peripheral IV 19 Right Hand (Active) Site Assessment Clean, dry, & intact 2019  4:47 PM  
Phlebitis Assessment 0 2019  4:47 PM  
Infiltration Assessment 0 2019  4:47 PM  
Dressing Status Clean, dry, & intact 2019  4:47 PM  
Dressing Type Tape;Transparent 2019  4:47 PM  
Hub Color/Line Status Infusing;Green 2019  4:47 PM  
  
 
Opportunity for questions and clarification was provided. Patient transported with: 
 Registered Nurse

## 2019-02-27 NOTE — H&P
Labor and Delivery Admission Note 2019 Patient is a 45 y.o. Sugar Armendariz at 39w5d who presents with contractions at 0325. She reports contractions started around 2200 last night and are now every 5-6 minutes. She reports good FM. Denies VB, LOF. She denies h/a, changes in vision, RUQ pain. She reports prenatal care with Dr. Tyler Feliciano c/b morbid obesity, GDM (metformin 1000mg daily), AMA, anemia, CHTN (per prental although patient denies this and reports not on any meds). Per patient last efw was  8lbs 14oz She reports 4 previous SVDs without issues. Largest baby Karlton Homans. PNC: Blood type: O 
          RH: pos Hep B: neg 
          Rubella: immune GBS status: negative OBHX:  
OB History  Para Term  AB Living 1 SAB TAB Ectopic Molar Multiple Live Births PMH:  
Past Medical History:  
Diagnosis Date  Depression PSH: No past surgical history on file. OB/GYN:  at 39w5d. 4 previous SVDs without complications per patient. This pregnancy c/b morbid obesity, GDM, AMA, CHTN, Anemia Meds:  
Prior to Admission Medications Prescriptions Last Dose Informant Patient Reported? Taking?  
lidocaine (LIDODERM) 5 %   No No  
Sig: Apply patch to the affected area for 12 hours a day and remove for 12 hours a day. prenatal vit-iron fumarate-fa 27 mg iron- 0.8 mg tab tablet   No No  
Sig: Take 1 Tab by mouth daily. Indications: pregnancy Facility-Administered Medications: None Allergies: No Known Allergies Pertinent ROS: Review of Systems - Negative except noted in [facility]:  
Family History Problem Relation Age of Onset  Diabetes Mother  Hypertension Mother  Diabetes Father  Hypertension Father SH: Social History Socioeconomic History  Marital status: UNKNOWN Spouse name: Not on file  Number of children: Not on file  Years of education: Not on file  Highest education level: Not on file Social Needs  Financial resource strain: Not on file  Food insecurity - worry: Not on file  Food insecurity - inability: Not on file  Transportation needs - medical: Not on file  Transportation needs - non-medical: Not on file Occupational History  Not on file Tobacco Use  Smoking status: Light Tobacco Smoker Packs/day: 0.25  Smokeless tobacco: Never Used  Tobacco comment: black and mild once in awhile- NO CIGERETTES Substance and Sexual Activity  Alcohol use: Yes Comment: social  
 Drug use: No  
 Sexual activity: Yes  
  Partners: Male Birth control/protection: None Other Topics Concern  Not on file Social History Narrative  Not on file OBJECTIVE: 
 
No data recorded. Visit Vitals LMP 04/25/2018 Comment: MISCARRIAGE IN MAY  
 
 
FHR baseline 145s, no accels, no decels, moderate variability [place] q5-6 minutes, moderate Exam: 
General: alert & oriented x3 HEENT:  normal  
Lungs:  clear Cor:  RRR Abdomen:  Soft, non-tender Clinical EFW 9lbs Cervix:  3/50/-3 vtx confirmed by bedside sono Fluid:  intact Pelvimetry:  AP-good Arch- adequate Sidewalls- adequate Pelvis feels adequate for fetus. Extremities:  normal, trace edema Impression: [id number] at 39w5d IUP Latent labor Cat 1 tracing [facility] Morbid obesity Debbora Nephew CHTN Anemia Plan: 
 
Admit Review and sign consents CBC, T&S Check bs q2 Continuous monitoring Epidural prn Reviewed prenatal records Reviewed patient with Dr. Alexus Rizvi agrees with plan. Will proceed with checking BS q2h for now and adjust as needed based on INDY Jay CNM 
4:23 AM

## 2019-02-27 NOTE — PROGRESS NOTES
TRANSFER - IN REPORT: 
 
Verbal report received from OLAYINKA Jarrell RN(name) on Arlin Vargas  being received from L&D(unit) for routine progression of care Report consisted of patients Situation, Background, Assessment and  
Recommendations(SBAR). Information from the following report(s) SBAR was reviewed with the receiving nurse. Opportunity for questions and clarification was provided. Assessment completed upon patients arrival to unit and care assumed. 1858: Pt up with one assist to restroom, gait steady, pt denies lightheadedness/dizziness. Pt voided, germain-care complete. Check void complete.

## 2019-02-28 PROCEDURE — 74011250637 HC RX REV CODE- 250/637: Performed by: OBSTETRICS & GYNECOLOGY

## 2019-02-28 PROCEDURE — 65410000002 HC RM PRIVATE OB

## 2019-02-28 RX ORDER — SODIUM CHLORIDE, SODIUM LACTATE, POTASSIUM CHLORIDE, CALCIUM CHLORIDE 600; 310; 30; 20 MG/100ML; MG/100ML; MG/100ML; MG/100ML
125 INJECTION, SOLUTION INTRAVENOUS CONTINUOUS
Qty: 40 ML | Refills: 1 | Status: SHIPPED | OUTPATIENT
Start: 2019-02-28 | End: 2019-02-28

## 2019-02-28 RX ORDER — IBUPROFEN 800 MG/1
800 TABLET ORAL EVERY 8 HOURS
Qty: 40 TAB | Refills: 1 | Status: SHIPPED | OUTPATIENT
Start: 2019-02-28 | End: 2019-12-16

## 2019-02-28 RX ADMIN — DOCUSATE SODIUM 100 MG: 100 CAPSULE, LIQUID FILLED ORAL at 19:27

## 2019-02-28 RX ADMIN — OXYCODONE AND ACETAMINOPHEN 1 TABLET: 5; 325 TABLET ORAL at 07:32

## 2019-02-28 RX ADMIN — OXYCODONE AND ACETAMINOPHEN 1 TABLET: 5; 325 TABLET ORAL at 19:27

## 2019-02-28 RX ADMIN — OXYCODONE AND ACETAMINOPHEN 1 TABLET: 5; 325 TABLET ORAL at 12:31

## 2019-02-28 RX ADMIN — IBUPROFEN 800 MG: 400 TABLET ORAL at 15:52

## 2019-02-28 NOTE — ROUTINE PROCESS
0800: Bedside and Verbal shift change report given to Norman Judge RN  (oncoming nurse) by JAY JAY Nogueira RN (offgoing nurse). Report included the following information SBAR.

## 2019-02-28 NOTE — DISCHARGE SUMMARY
Obstetrical Discharge Summary     Name: Michel Cote MRN: [id number]  SSN: [id number]    YOB: 1980  Age: 45 y.o. Sex: female      Admit Date: 2019    Discharge Date: 2019    Admitting Physician: Maria Esther Muñiz MD     Attending Physician:  Leann Matthews MD     Admission Diagnoses: Abdominal pain affecting pregnancy [O26.899, R10.9]  39 weeks gestation of pregnancy [Z3A.39]    Discharge Diagnoses:   Information for the patient's :  Ventura Ryan [[id number]]   Delivery of a 4.05 kg female infant via Vaginal, Spontaneous on 2019 at 3:44 PM  by . Apgars were 9 and 10. Additional Diagnoses:   Hospital Problems  Date Reviewed: 2019          Codes Class Noted POA    39 weeks gestation of pregnancy ICD-10-CM: Z3A.39  ICD-9-CM: V22.2  2019 Unknown        Abdominal pain affecting pregnancy ICD-10-CM: O26.899, R10.9  ICD-9-CM: 646.80, 789.00  2019 Unknown             Lab Results   Component Value Date/Time    Rubella, External immune 2018    GrBStrep, External negative 2019       Immunization(s):   Immunization History   Administered Date(s) Administered    Influenza Vaccine 10/26/2015    MMR 1993    Td 2014        Hospital Course: Normal hospital course following the delivery. Patient Instructions:   Current Discharge Medication List      START taking these medications    Details   Ringer's solution,lactated (LACTATED RINGERS) infusion 125 mL/hr by IntraVENous route continuous. Qty: 40 mL, Refills: 1         CONTINUE these medications which have NOT CHANGED    Details   metFORMIN (GLUCOPHAGE) 1,000 mg tablet Take 500 mg by mouth two (2) times a day. lidocaine (LIDODERM) 5 % Apply patch to the affected area for 12 hours a day and remove for 12 hours a day. Qty: 15 Each, Refills: 0      prenatal vit-iron fumarate-fa 27 mg iron- 0.8 mg tab tablet Take 1 Tab by mouth daily.  Indications: pregnancy  Qty: 30 Tab, Refills: 11 Associated Diagnoses: Positive pregnancy test             Please make followup appointment for 4-6 weeks  Pelvic rest for 6 weeks  Pain medication as prescribed. Use of contraception as discussed.     Condition: stable  Disposition: home    Follow-up Appointments   Procedures    FOLLOW UP VISIT Appointment in: [facility]     Standing Status:   Standing     Number of Occurrences:   1     Order Specific Question:   Appointment in     Answer:   6 Weeks        Signed By:  Rudolph Calrk MD     February 28, 2019

## 2019-02-28 NOTE — PROGRESS NOTES
Pt states she is voiding a lot, like she did while pregnant, asking if that is ok. Asked pt if she felt like she was emptying her bladder completely or did she feel like she still had to void. Pt states she feels like she is emptying her bladder fully, just frequently, sometimes 3x in 1 hour, states she has a lot when she goes. Fundus assessed +2 above umbilicus, midline, fundus firm, lochia scant. Will cont to obs, pt notified to call nurse if vaginal bleeding increases, she passes clots, has pain like she needs to void or she feels she is not emptying bladder fully. Pt states she understands. 0245-in for assessment, fundus noted midline at +4 above umbilicus, firm with scant lochia curt. . Pt states she last voided at 0200 and does not feel like she has to void. Bladder scan done to assess bladder 362ml noted on scan. 0300-Bladder rescaned past pt void, 92ml noted on scan, fundal mervin remains at +4 above umbilicus, midline, firm with scant lochia curt. 0400- pt voided, rescaned. 27ml noted on scan, fundal height remains at +4 above umbilicus, midline,firm with scant to small curt. Will cont to obs and pt reminded to notify nurse if any changes occur.

## 2019-02-28 NOTE — PROGRESS NOTES
Post-Partum Day Number 1 Progress Note Felice Hawk Assessment: Doing well, post partum day 1 Plan: 1. Continue routine postpartum and perineal care as well as maternal education. 2. N/A Information for the patient's :  Nicole Foster [[id number]] Vaginal, Spontaneous Patient doing well without significant complaint. Voiding without difficulty, normal lochia. Vitals: 
Visit Vitals /58 (BP 1 Location: Left arm, BP Patient Position: Sitting) Pulse 74 Temp 97.8 °F (36.6 °C) Resp 16 LMP 2018 Comment: MISCARRIAGE IN MAY SpO2 100% Breastfeeding? Unknown Temp (24hrs), Av.3 °F (36.8 °C), Min:97.8 °F (36.6 °C), Max:98.7 °F (37.1 °C) Exam:   Patient without distress. Abdomen soft, fundus firm, nontender Perineum with normal lochia noted. Lower extremities are negative for swelling, cords or tenderness. Labs:  
 
Lab Results Component Value Date/Time WBC 5.0 2019 04:23 AM  
 WBC 6.4 12/15/2018 02:53 PM  
 WBC 6.7 2018 03:15 PM  
 WBC 4.2 2018 11:51 AM  
 HGB 10.1 (L) 2019 04:23 AM  
 HGB 10.1 (L) 12/15/2018 02:53 PM  
 HGB 11.6 2018 03:15 PM  
 HGB 11.4 2018 11:51 AM  
 HCT 34.2 (L) 2019 04:23 AM  
 HCT 32.4 (L) 12/15/2018 02:53 PM  
 HCT 37.5 2018 03:15 PM  
 HCT 36.5 2018 11:51 AM  
 PLATELET 103  04:23 AM  
 PLATELET 499  02:53 PM  
 PLATELET 365  03:15 PM  
 PLATELET 131  11:51 AM  
 
 
Recent Results (from the past 24 hour(s)) GLUCOSE, POC Collection Time: 19 10:06 AM  
Result Value Ref Range Glucose (POC) 107 (H) 65 - 100 mg/dL Performed by Kevin Garcia GLUCOSE, POC Collection Time: 19 12:13 PM  
Result Value Ref Range Glucose (POC) 91 65 - 100 mg/dL Performed by Kevin Garcia GLUCOSE, POC Collection Time: 19  2:22 PM  
Result Value Ref Range Glucose (POC) 102 (H) 65 - 100 mg/dL Performed by Ortiz Peck METABOLIC PANEL, COMPREHENSIVE Collection Time: 02/27/19  6:01 PM  
Result Value Ref Range Sodium 137 136 - 145 mmol/L Potassium 3.8 3.5 - 5.1 mmol/L Chloride 106 97 - 108 mmol/L  
 CO2 19 (L) 21 - 32 mmol/L Anion gap 12 5 - 15 mmol/L Glucose 116 (H) 65 - 100 mg/dL BUN 7 6 - 20 MG/DL Creatinine 0.75 0.55 - 1.02 MG/DL  
 BUN/Creatinine ratio 9 (L) 12 - 20 GFR est AA >60 >60 ml/min/1.73m2 GFR est non-AA >60 >60 ml/min/1.73m2 Calcium 8.4 (L) 8.5 - 10.1 MG/DL Bilirubin, total 0.4 0.2 - 1.0 MG/DL  
 ALT (SGPT) 13 12 - 78 U/L  
 AST (SGOT) 17 15 - 37 U/L Alk. phosphatase 208 (H) 45 - 117 U/L Protein, total 6.2 (L) 6.4 - 8.2 g/dL Albumin 2.5 (L) 3.5 - 5.0 g/dL Globulin 3.7 2.0 - 4.0 g/dL A-G Ratio 0.7 (L) 1.1 - 2.2

## 2019-02-28 NOTE — ROUTINE PROCESS
Bedside shift change report given to Ariel Bell RN (oncoming nurse) by Khang Garcia RN (offgoing nurse). Report included the following information SBAR, Procedure Summary, Intake/Output, MAR and Recent Results.

## 2019-03-01 VITALS
TEMPERATURE: 97.5 F | RESPIRATION RATE: 18 BRPM | SYSTOLIC BLOOD PRESSURE: 144 MMHG | HEART RATE: 78 BPM | DIASTOLIC BLOOD PRESSURE: 88 MMHG | OXYGEN SATURATION: 100 %

## 2019-03-01 PROCEDURE — 74011250637 HC RX REV CODE- 250/637: Performed by: OBSTETRICS & GYNECOLOGY

## 2019-03-01 RX ORDER — OXYCODONE AND ACETAMINOPHEN 5; 325 MG/1; MG/1
1 TABLET ORAL
Qty: 20 TAB | Refills: 0 | Status: SHIPPED | OUTPATIENT
Start: 2019-03-01 | End: 2019-03-04

## 2019-03-01 RX ADMIN — IBUPROFEN 800 MG: 400 TABLET ORAL at 09:38

## 2019-03-01 RX ADMIN — IBUPROFEN 800 MG: 400 TABLET ORAL at 00:12

## 2019-03-01 RX ADMIN — OXYCODONE AND ACETAMINOPHEN 1 TABLET: 5; 325 TABLET ORAL at 09:41

## 2019-03-01 RX ADMIN — OXYCODONE AND ACETAMINOPHEN 1 TABLET: 5; 325 TABLET ORAL at 00:12

## 2019-03-01 RX ADMIN — OXYCODONE AND ACETAMINOPHEN 1 TABLET: 5; 325 TABLET ORAL at 05:05

## 2019-03-01 NOTE — PROGRESS NOTES
Post-Partum Day Number 2 Progress Note Michel Cote Assessment: Doing well, post partum day 2 Plan: 1. Discharge home today 2. Follow up in office in 6 weeks with Leann Matthews MD 
3. Post partum activity advised, diet as tolerated 4. Discharge Medications: ibuprofen, percocet and medications prior to admission 5. Will need 2 hr GTT at her postop check. Information for the patient's :  Ventura Ryan [[id number]] Vaginal, Spontaneous Patient doing well without significant complaint. Voiding without difficulty, normal lochia. Vitals: 
Visit Vitals /88 Pulse 78 Temp 97.5 °F (36.4 °C) Resp 18 LMP 2018 Comment: MISCARRIAGE IN MAY SpO2 100% Breastfeeding? Unknown Temp (24hrs), Av.9 °F (36.6 °C), Min:97.5 °F (36.4 °C), Max:98.3 °F (36.8 °C) Exam:    
    Patient without distress. Abdomen soft, fundus firm, nontender Lower extremities are negative for swelling, cords or tenderness. Labs:  
 
Lab Results Component Value Date/Time WBC 5.0 2019 04:23 AM  
 WBC 6.4 12/15/2018 02:53 PM  
 WBC 6.7 2018 03:15 PM  
 WBC 4.2 2018 11:51 AM  
 HGB 10.1 (L) 2019 04:23 AM  
 HGB 10.1 (L) 12/15/2018 02:53 PM  
 HGB 11.6 2018 03:15 PM  
 HGB 11.4 2018 11:51 AM  
 HCT 34.2 (L) 2019 04:23 AM  
 HCT 32.4 (L) 12/15/2018 02:53 PM  
 HCT 37.5 2018 03:15 PM  
 HCT 36.5 2018 11:51 AM  
 PLATELET 115  04:23 AM  
 PLATELET 113  02:53 PM  
 PLATELET 574  03:15 PM  
 PLATELET 336  11:51 AM  
 
 
No results found for this or any previous visit (from the past 24 hour(s)).

## 2019-03-01 NOTE — DISCHARGE INSTRUCTIONS
POSTPARTUM DISCHARGE INSTRUCTIONS       Name:  Michel Cote  YOB: 1980  Admission Diagnosis:  Abdominal pain affecting pregnancy [O26.899, R10.9]  39 weeks gestation of pregnancy [Z3A.39]     Discharge Diagnosis:    Problem List as of 3/1/2019 Date Reviewed: 2/27/2019          Codes Class Noted - Resolved    39 weeks gestation of pregnancy ICD-10-CM: Z3A.39  ICD-9-CM: V22.2  2/27/2019 - Present        Abdominal pain affecting pregnancy ICD-10-CM: O26.899, R10.9  ICD-9-CM: 646.80, 789.00  2/27/2019 - Present        Prediabetes ICD-10-CM: R73.03  ICD-9-CM: 790.29  4/16/2018 - Present        Vitamin D deficiency ICD-10-CM: E55.9  ICD-9-CM: 268.9  4/16/2018 - Present        Elevated LDL cholesterol level ICD-10-CM: E78.00  ICD-9-CM: 272.0  4/16/2018 - Present        Obesity, morbid ([address] ICD-10-CM: E66.01  ICD-9-CM: 278.01  3/16/2018 - Present        Anxiety and depression ICD-10-CM: F41.9, F32.9  ICD-9-CM: 300.00, 311  3/16/2018 - Present        RESOLVED: History of prediabetes ICD-10-CM: I35.341  ICD-9-CM: V12.29  3/16/2018 - 4/16/2018            Attending Physician:  Leann Matthews MD    Delivery Type:  Vaginal Childbirth: What To Expect At Home    Your Recovery: Your body will slowly heal in the next few weeks. It is easy to get too tired and overwhelmed during the first weeks after your baby is born. Changes in your hormones can shift your mood without warning. You may find it hard to meet the extra demands on your energy and time. Take it easy on yourself. Follow-up care is a key part of your treatment and safety. Be sure to make and go to all appointments, and call your doctor if you are having problems. It's also a good idea to know your test results and keep a list of the medicines you take. How can you care for yourself at home? Vaginal bleeding and cramps  · After delivery, you will have a bloody discharge from the vagina.  This will turn pink within a week and then white or yellow after about 10 days. It may last for 2 to 4 weeks or longer, until the uterus has healed. Use pads instead of tampons until you stop bleeding. · Do not worry if you pass some blood clots, as long as they are smaller than a golf ball. If you have a tear or stitches in your vaginal area, change the pad at least every 4 hours to prevent soreness and infection. · You may have cramps for the first few days after childbirth. These are normal and occur as the uterus shrinks to normal size. Take an over-the-counter pain medicine, such as acetaminophen (Tylenol), ibuprofen (Advil, Motrin), or naproxen (Aleve), for cramps. Read and follow all instructions on the label. Do not take aspirin, because it can cause more bleeding. Do not take acetaminophen (Tylenol) and other acetaminophen containing medications (i.e. Percocet) at the same time. Breast fullness  · Your breasts may overfill (engorge) in the first few days after delivery. To help milk flow and to relieve pain, warm your breasts in the shower or by using warm, moist towels before nursing. · If you are not nursing, do not put warmth on your breasts or touch your breasts. Wear a tight bra or sports bra and use ice until the fullness goes away. This usually takes 2 to 3 days. · Put ice or a cold pack on your breast after nursing to reduce swelling and pain. Put a thin cloth between the ice and your skin. Activity  · Eat a balanced diet. Do not try to lose weight by cutting calories. Keep taking your prenatal vitamins, or take a multivitamin. · Get as much rest as you can. Try to take naps when your baby sleeps during the day. · Get some exercise every day. But do not do any heavy exercise until your doctor says it is okay. · Wait until you are healed (about 4 to 6 weeks) before you have sexual intercourse. Your doctor will tell you when it is okay to have sex. · Talk to your doctor about birth control.  You can get pregnant even before your period returns. Also, you can get pregnant while you are breast-feeding. Mental Health  · Many women get the \"baby blues\" during the first few days after childbirth. You may lose sleep, feel irritable, and cry easily. You may feel happy one minute and sad the next. Hormone changes are one cause of these emotional changes. Also, the demands of a new baby, along with visits from relatives or other family needs, add to a mother's stress. The \"baby blues\" often peak around the fourth day. Then they ease up in less than 2 weeks. · If your moodiness or anxiety lasts for more than 2 weeks, or if you feel like life is not worth living, you may have postpartum depression. This is different for each mother. Some mothers with serious depression may worry intensely about their infant's well-being. Others may feel distant from their child. Some mothers might even feel that they might harm their baby. A mother may have signs of paranoia, wondering if someone is watching her. · With all the changes in your life, you may not know if you are depressed. Pregnancy sometimes causes changes in how you feel that are similar to the symptoms of depression. · Symptoms of depression include:  · Feeling sad or hopeless and losing interest in daily activities. These are the most common symptoms of depression. · Sleeping too much or not enough. · Feeling tired. You may feel as if you have no energy. · Eating too much or too little. · POSTPARTUM SUPPORT INTERNATIONAL (PSI) offers a Warm line; Chat with the Expert phone sessions; Information and Articles about Pregnancy and Postpartum Mood Disorders; Comprehensive List of Free Support Groups; Knowledgeable local coordinators who will offer support, information, and resources; Guide to Resources on [employer]; Calendar of events in the  mood disorders community; Latest News and Research; and [facility] [address] for [employer]. Remember - You are not alone;  You are not to blame; With help, you will be well. 5-733-535-PPD(3043). WWW. POSTPARTUM. NET   · Writing or talking about death, such as writing suicide notes or talking about guns, knives, or pills. Keep the numbers for these national suicide hotlines: 0-391-813-TALK (3-582.318.1484) and 3-757-JIMRGJK (6-984.917.8810). If you or someone you know talks about suicide or feeling hopeless, get help right away. Constipation and Hemorrhoids  · Drink plenty of fluids, enough so that your urine is light yellow or clear like water. If you have kidney, heart, or liver disease and have to limit fluids, talk with your doctor before you increase the amount of fluids you drink. · Eat plenty of fiber each day. Have a bran muffin or bran cereal for breakfast, and try eating a piece of fruit for a mid-afternoon snack. · For painful, itchy hemorrhoids, put ice or a cold pack on the area several times a day for 10 minutes at a time. Follow this by putting a warm compress on the area for another 10 to 20 minutes or by sitting in a shallow, warm bath. When should you call for help? Call 911 anytime you think you may need emergency care. For example, call if:  · You are thinking of hurting yourself, your baby, or anyone else. · You passed out (lost consciousness). · You have symptoms of a blood clot in your lung (called a pulmonary embolism). These may include:    · Sudden chest pain. · Trouble breathing. · Coughing up blood. Call your doctor now or seek immediate medical care if:  · You have severe vaginal bleeding. · You are soaking through a pad each hour for 2 or more hours. · Your vaginal bleeding seems to be getting heavier or is still bright red 4 days after delivery. · You are dizzy or lightheaded, or you feel like you may faint. · You are vomiting or cannot keep fluids down. · You have a fever. · You have new or more belly pain. · You pass tissue (not just blood). · Your vaginal discharge smells bad.   · Your belly feels tender or full and hard. · Your breasts are continuously painful or red. · You feel sad, anxious, or hopeless for more than a few days. · You have sudden, severe pain in your belly. · You have symptoms of a blood clot in your leg (called a deep vein thrombosis),          such as:  · Pain in your calf, back of the knee, thigh, or groin. · Redness and swelling in your leg or groin. · You have symptoms of preeclampsia, such as:  · Sudden swelling of your face, hands, or feet. · New vision problems (such as dimness or blurring). · A severe headache. · Your blood pressure is higher than it should be or rises suddenly. · You have new nausea or vomiting. Watch closely for changes in your health, and be sure to contact your doctor if you have any problems. Additional Information:  Not applicable    These are general instructions for a healthy lifestyle:    No smoking/ No tobacco products/ Avoid exposure to second hand smoke    Surgeon General's Warning:  Quitting smoking now greatly reduces serious risk to your health. Obesity, smoking, and sedentary lifestyle greatly increases your risk for illness    A healthy diet, regular physical exercise & weight monitoring are important for maintaining a healthy lifestyle    Recognize signs and symptoms of STROKE:    F-face looks uneven    A-arms unable to move or move unevenly    S-speech slurred or non-existent    T-time-call 911 as soon as signs and symptoms begin - DO NOT go       back to bed or wait to see if you get better - TIME IS BRAIN. I have had the opportunity to make my options or choices for discharge. I have received and understand these instructions.

## 2019-08-16 ENCOUNTER — HOSPITAL ENCOUNTER (EMERGENCY)
Age: 39
Discharge: HOME | End: 2019-08-17
Attending: EMERGENCY MEDICINE
Payer: MEDICAID

## 2019-08-16 ENCOUNTER — APPOINTMENT (OUTPATIENT)
Dept: ULTRASOUND IMAGING | Age: 39
End: 2019-08-16
Attending: PHYSICIAN ASSISTANT
Payer: MEDICAID

## 2019-08-16 DIAGNOSIS — O46.90 VAGINAL BLEEDING IN PREGNANCY: Primary | ICD-10-CM

## 2019-08-16 LAB
ABO + RH BLD: NORMAL
ANION GAP SERPL CALC-SCNC: 6 MMOL/L (ref 5–15)
BASOPHILS # BLD: 0 K/UL (ref 0–0.1)
BASOPHILS NFR BLD: 1 % (ref 0–1)
BLOOD BANK CMNT PATIENT-IMP: NORMAL
BUN SERPL-MCNC: 11 MG/DL (ref 6–20)
BUN/CREAT SERPL: 14 (ref 12–20)
CALCIUM SERPL-MCNC: 9.2 MG/DL (ref 8.5–10.1)
CHLORIDE SERPL-SCNC: 106 MMOL/L (ref 97–108)
CO2 SERPL-SCNC: 26 MMOL/L (ref 21–32)
COMMENT, HOLDF: NORMAL
CREAT SERPL-MCNC: 0.78 MG/DL (ref 0.55–1.02)
DIFFERENTIAL METHOD BLD: ABNORMAL
EOSINOPHIL # BLD: 0.1 K/UL (ref 0–0.4)
EOSINOPHIL NFR BLD: 2 % (ref 0–7)
ERYTHROCYTE [DISTWIDTH] IN BLOOD BY AUTOMATED COUNT: 14.5 % (ref 11.5–14.5)
GLUCOSE SERPL-MCNC: 101 MG/DL (ref 65–100)
HCG SERPL-ACNC: 312 MIU/ML (ref 0–6)
HCT VFR BLD AUTO: 36.9 % (ref 35–47)
HGB BLD-MCNC: 11.1 G/DL (ref 11.5–16)
IMM GRANULOCYTES # BLD AUTO: 0 K/UL (ref 0–0.04)
IMM GRANULOCYTES NFR BLD AUTO: 0 % (ref 0–0.5)
LYMPHOCYTES # BLD: 2.6 K/UL (ref 0.8–3.5)
LYMPHOCYTES NFR BLD: 45 % (ref 12–49)
MCH RBC QN AUTO: 23 PG (ref 26–34)
MCHC RBC AUTO-ENTMCNC: 30.1 G/DL (ref 30–36.5)
MCV RBC AUTO: 76.4 FL (ref 80–99)
MONOCYTES # BLD: 0.5 K/UL (ref 0–1)
MONOCYTES NFR BLD: 8 % (ref 5–13)
NEUTS SEG # BLD: 2.5 K/UL (ref 1.8–8)
NEUTS SEG NFR BLD: 44 % (ref 32–75)
NRBC # BLD: 0 K/UL (ref 0–0.01)
NRBC BLD-RTO: 0 PER 100 WBC
PLATELET # BLD AUTO: 250 K/UL (ref 150–400)
PMV BLD AUTO: 10.5 FL (ref 8.9–12.9)
POTASSIUM SERPL-SCNC: 3.9 MMOL/L (ref 3.5–5.1)
RBC # BLD AUTO: 4.83 M/UL (ref 3.8–5.2)
SAMPLES BEING HELD,HOLD: NORMAL
SODIUM SERPL-SCNC: 138 MMOL/L (ref 136–145)
WBC # BLD AUTO: 5.7 K/UL (ref 3.6–11)

## 2019-08-16 PROCEDURE — 85025 COMPLETE CBC W/AUTO DIFF WBC: CPT

## 2019-08-16 PROCEDURE — 36415 COLL VENOUS BLD VENIPUNCTURE: CPT

## 2019-08-16 PROCEDURE — 80048 BASIC METABOLIC PNL TOTAL CA: CPT

## 2019-08-16 PROCEDURE — 76817 TRANSVAGINAL US OBSTETRIC: CPT

## 2019-08-16 PROCEDURE — 86900 BLOOD TYPING SEROLOGIC ABO: CPT

## 2019-08-16 PROCEDURE — 84702 CHORIONIC GONADOTROPIN TEST: CPT

## 2019-08-16 PROCEDURE — 76801 OB US < 14 WKS SINGLE FETUS: CPT

## 2019-08-16 PROCEDURE — 99283 EMERGENCY DEPT VISIT LOW MDM: CPT

## 2019-08-16 NOTE — LETTER
Sarah Horn 55 
[address] [facility] [address] [id number] [phone] Work/School Note Date: 8/16/2019 To Whom It May concern: 
 
Florentino Young was seen and treated today in the emergency room by the following provider(s): 
Attending Provider: Stacie Link MD 
Physician Assistant: MARYAM Azevedo. Florentino Young may return to work on 8/19/19. Sincerely, MARYAM Perez

## 2019-08-16 NOTE — ED TRIAGE NOTES
Pt reports she is 4-5 weeks pregnant ans began with light spotting earlier today. Pt denies cramping.

## 2019-08-17 VITALS
TEMPERATURE: 98.3 F | SYSTOLIC BLOOD PRESSURE: 148 MMHG | RESPIRATION RATE: 16 BRPM | DIASTOLIC BLOOD PRESSURE: 89 MMHG | HEART RATE: 82 BPM | OXYGEN SATURATION: 99 %

## 2019-08-17 NOTE — DISCHARGE INSTRUCTIONS
Patient Education        Vaginal Bleeding During Pregnancy: Care Instructions  Your Care Instructions    Many women have some vaginal bleeding when they are pregnant. In some cases, the bleeding is not serious. And there aren't any more problems with the pregnancy. But sometimes bleeding is a sign of a more serious problem. This is more common if the bleeding is heavy or painful. Examples of more serious problems include miscarriage, an ectopic pregnancy, or a problem with the placenta. You may have to see your doctor again to be sure everything is okay. You may also need more tests to find the cause of the bleeding. For some women, home treatment is all they need. But it depends on what is causing the bleeding. Be sure to tell your doctor if you have any new symptoms or if your symptoms get worse. The doctor has checked you carefully, but problems can develop later. If you notice any problems or new symptoms, get medical treatment right away. Follow-up care is a key part of your treatment and safety. Be sure to make and go to all appointments, and call your doctor if you are having problems. It's also a good idea to know your test results and keep a list of the medicines you take. How can you care for yourself at home? · If your doctor prescribed medicines, take them exactly as directed. Call your doctor if you think you are having a problem with your medicine. · Do not have sex until the bleeding stops and your doctor says it's okay. · Ask your doctor about other activities you can or can't do. · Get a lot of rest. Being pregnant can make you tired. · Eat a healthy diet. Include a lot of peas, beans, and leafy green vegetables. Talk to a dietitian if you need help planning your diet. · Do not use nonsteroidal anti-inflammatory drugs (NSAIDs), such as ibuprofen (Advil, Motrin), naproxen (Aleve), or aspirin, unless your doctor says it is okay. When should you call for help?   Call 911 anytime you think you may need emergency care. For example, call if:    · You passed out (lost consciousness).     · You have severe vaginal bleeding.    Call your doctor now or seek immediate medical care if:    · You have any vaginal bleeding.     · You have pain in your belly or pelvis.     · You think that you are in labor.     · You have a sudden release of fluid from your vagina.     · You notice that your baby has stopped moving or is moving much less than normal.    Watch closely for changes in your health, and be sure to contact your doctor if you have any questions or concerns. Where can you learn more? Go to http://daniel-winston.info/. Enter B151 in the search box to learn more about \"Vaginal Bleeding During Pregnancy: Care Instructions. \"  Current as of: September 5, 2018  Content Version: 12.1  © 4411-5498 [employer], [employer]. Care instructions adapted under license by [employer] (which disclaims liability or warranty for this information). If you have questions about a medical condition or this instruction, always ask your healthcare professional. [address] any warranty or liability for your use of this information.

## 2019-08-17 NOTE — ED NOTES
Pt currently in [address]. Report to [facility].

## 2019-08-17 NOTE — ED PROVIDER NOTES
46 y/o pregnant female with PMHx of anemia, gestational diabetes, depression and anxiety, presenting with complaint of vaginal bleeding. The patient states that she had a positive home pregnancy test \"a few weeks ago. \"  Shortly afterwards she began to experience vaginal spotting and intermittent RLQ abdominal pain described as cramping. She saw her OB/GYN last week, who estimated that she was about 4 or 5 weeks pregnant and instructed her to return to the office yesterday for repeat blood work. Patient states that she was unable to follow-up yesterday due to her work schedule. Today she reports heavier spotting, which prompted her visit to the ED tonight. She endorses nausea but denies fevers, vomiting, diarrhea, dysuria, lightheadedness or syncope. The history is provided by the patient and medical records. No  was used. Past Medical History:   Diagnosis Date    Anemia     Chlamydia     hx previous pregnancy    Depression      hx  anxiety    Disease of blood and blood forming organ     Gestational diabetes     Taking metformin 500mg BID       History reviewed. No pertinent surgical history.       Family History:   Problem Relation Age of Onset    Diabetes Mother     Hypertension Mother     Diabetes Father     Hypertension Father        Social History     Socioeconomic History    Marital status: UNKNOWN     Spouse name: Not on file    Number of children: Not on file    Years of education: Not on file    Highest education level: Not on file   Occupational History    Not on file   Social Needs    Financial resource strain: Not on file    Food insecurity:     Worry: Not on file     Inability: Not on file    Transportation needs:     Medical: Not on file     Non-medical: Not on file   Tobacco Use    Smoking status: Light Tobacco Smoker     Packs/day: 0.25    Smokeless tobacco: Never Used    Tobacco comment: black and mild once in awhile- NO CIGERETTES   Substance and Sexual Activity    Alcohol use: Yes     Comment: social    Drug use: No    Sexual activity: Yes     Partners: Male     Birth control/protection: None   Lifestyle    Physical activity:     Days per week: Not on file     Minutes per session: Not on file    Stress: Not on file   Relationships    Social connections:     Talks on phone: Not on file     Gets together: Not on file     Attends Gnosticism service: Not on file     Active member of club or organization: Not on file     Attends meetings of clubs or organizations: Not on file     Relationship status: Not on file    Intimate partner violence:     Fear of current or ex partner: Not on file     Emotionally abused: Not on file     Physically abused: Not on file     Forced sexual activity: Not on file   Other Topics Concern     Service Not Asked    Blood Transfusions Not Asked    Caffeine Concern Not Asked    Occupational Exposure Not Asked   Steva Sheffield Hazards Not Asked    Sleep Concern Not Asked    Stress Concern Not Asked    Weight Concern Not Asked    Special Diet Not Asked    Back Care Not Asked    Exercise Not Asked    Bike Helmet Not Asked   [address] [facility] Not Asked    Self-Exams Not Asked   Social History Narrative    Not on file         ALLERGIES: Patient has no known allergies. Review of Systems   Constitutional: Negative for chills and fever. HENT: Negative for congestion. Respiratory: Negative for cough. Gastrointestinal: Positive for abdominal pain and nausea. Negative for diarrhea and vomiting. Genitourinary: Positive for vaginal bleeding. Negative for dysuria, frequency and urgency. Musculoskeletal: Negative for myalgias. Skin: Negative for wound. Neurological: Negative for syncope and light-headedness. All other systems reviewed and are negative.       Vitals:    08/16/19 2126   BP: (!) 152/103   Pulse: 79   Resp: 16   Temp: 98.5 °F (36.9 °C)   SpO2: 98%            Physical Exam   Constitutional: She is oriented to person, place, and time. She appears well-developed and well-nourished. No distress. HENT:   Head: Normocephalic and atraumatic. Eyes: Conjunctivae and EOM are normal.   Neck: Normal range of motion. Neck supple. Cardiovascular: Normal rate, regular rhythm and normal heart sounds. Pulmonary/Chest: Effort normal and breath sounds normal.   Abdominal: Soft. Bowel sounds are normal. She exhibits no distension. There is no tenderness. There is no guarding. Neurological: She is alert and oriented to person, place, and time. Skin: Skin is warm and dry. She is not diaphoretic. Nursing note and vitals reviewed. MDM  Number of Diagnoses or Management Options  Vaginal bleeding in pregnancy:      Amount and/or Complexity of Data Reviewed  Clinical lab tests: ordered and reviewed  Tests in the radiology section of CPT®: ordered and reviewed    Patient Progress  Patient progress: stable         Procedures        46 y/o pregnant female with PMHx of anemia, gestational diabetes, depression and anxiety, presenting with complaint of vaginal bleeding. History and exam concerning for spontaneous  vs subchorionic hemorrhage. Differential also includes ectopic pregnancy however the patient denies any pain currently and has no tenderness on exam. Pelvic ultrasound reveals no identifiable IUP - in correlation with  this could represent early pregnancy vs ectopic. The patient is hemodynamically stable, safe for outpatient OB/GYN follow up for repeat HCG. Strict ED return precautions discussed and provided in writing at time of discharge. The patient verbalized understanding and agreement with this plan.

## 2019-12-16 NOTE — PERIOP NOTES
PAT TELEPHONE INTERVIEW COMPLETED WITH PATIENT. INSTRUCTIONS GIVEN ON MEDICATIONS AND NPO AFTER MIDNIGHT.  PATIENT DID NOT HAVE ANYTHING TO WRITE ON , SO I CALLED BACK AND LEFT INSTRUCTIONS FOR USE OF HIBICLENS AND THE ADDRESS OF PAT IS SHE WAS ABLE TO COME BY FOR HIBICLENS AND PRINTED INSTRUCTIONS

## 2019-12-17 ENCOUNTER — ANESTHESIA (OUTPATIENT)
Dept: SURGERY | Age: 39
End: 2019-12-17
Payer: MEDICAID

## 2019-12-17 ENCOUNTER — ANESTHESIA EVENT (OUTPATIENT)
Dept: SURGERY | Age: 39
End: 2019-12-17
Payer: MEDICAID

## 2019-12-17 ENCOUNTER — HOSPITAL ENCOUNTER (OUTPATIENT)
Age: 39
Setting detail: OUTPATIENT SURGERY
Discharge: HOME | End: 2019-12-17
Attending: OBSTETRICS & GYNECOLOGY | Admitting: OBSTETRICS & GYNECOLOGY
Payer: MEDICAID

## 2019-12-17 VITALS
RESPIRATION RATE: 19 BRPM | HEIGHT: 67 IN | WEIGHT: 271 LBS | HEART RATE: 73 BPM | DIASTOLIC BLOOD PRESSURE: 76 MMHG | OXYGEN SATURATION: 100 % | TEMPERATURE: 98.5 F | SYSTOLIC BLOOD PRESSURE: 129 MMHG | BODY MASS INDEX: 42.53 KG/M2

## 2019-12-17 DIAGNOSIS — Z98.890 STATUS POST D&C: Primary | ICD-10-CM

## 2019-12-17 LAB
ABO + RH BLD: NORMAL
BLOOD GROUP ANTIBODIES SERPL: NORMAL
ERYTHROCYTE [DISTWIDTH] IN BLOOD BY AUTOMATED COUNT: 13.9 % (ref 11.5–14.5)
GLUCOSE BLD STRIP.AUTO-MCNC: 95 MG/DL (ref 65–100)
HCT VFR BLD AUTO: 36.2 % (ref 35–47)
HGB BLD-MCNC: 11 G/DL (ref 11.5–16)
MCH RBC QN AUTO: 22.9 PG (ref 26–34)
MCHC RBC AUTO-ENTMCNC: 30.4 G/DL (ref 30–36.5)
MCV RBC AUTO: 75.3 FL (ref 80–99)
NRBC # BLD: 0 K/UL (ref 0–0.01)
NRBC BLD-RTO: 0 PER 100 WBC
PLATELET # BLD AUTO: 216 K/UL (ref 150–400)
PMV BLD AUTO: 10.7 FL (ref 8.9–12.9)
RBC # BLD AUTO: 4.81 M/UL (ref 3.8–5.2)
SERVICE CMNT-IMP: NORMAL
SPECIMEN EXP DATE BLD: NORMAL
WBC # BLD AUTO: 4.1 K/UL (ref 3.6–11)

## 2019-12-17 PROCEDURE — 77030003666 HC NDL SPINAL BD -A: Performed by: OBSTETRICS & GYNECOLOGY

## 2019-12-17 PROCEDURE — 76210000006 HC OR PH I REC 0.5 TO 1 HR: Performed by: OBSTETRICS & GYNECOLOGY

## 2019-12-17 PROCEDURE — 77030008578 HC TBNG UTER SUC BUSS -A: Performed by: OBSTETRICS & GYNECOLOGY

## 2019-12-17 PROCEDURE — 86900 BLOOD TYPING SEROLOGIC ABO: CPT

## 2019-12-17 PROCEDURE — 77030019905 HC CATH URETH INTMIT MDII -A: Performed by: OBSTETRICS & GYNECOLOGY

## 2019-12-17 PROCEDURE — 76210000020 HC REC RM PH II FIRST 0.5 HR: Performed by: OBSTETRICS & GYNECOLOGY

## 2019-12-17 PROCEDURE — 77030011210: Performed by: OBSTETRICS & GYNECOLOGY

## 2019-12-17 PROCEDURE — 74011250637 HC RX REV CODE- 250/637: Performed by: OBSTETRICS & GYNECOLOGY

## 2019-12-17 PROCEDURE — 76060000032 HC ANESTHESIA 0.5 TO 1 HR: Performed by: OBSTETRICS & GYNECOLOGY

## 2019-12-17 PROCEDURE — 36415 COLL VENOUS BLD VENIPUNCTURE: CPT

## 2019-12-17 PROCEDURE — 77030040922 HC BLNKT HYPOTHRM STRY -A

## 2019-12-17 PROCEDURE — 88305 TISSUE EXAM BY PATHOLOGIST: CPT

## 2019-12-17 PROCEDURE — 74011000250 HC RX REV CODE- 250: Performed by: OBSTETRICS & GYNECOLOGY

## 2019-12-17 PROCEDURE — 85027 COMPLETE CBC AUTOMATED: CPT

## 2019-12-17 PROCEDURE — 77030040361 HC SLV COMPR DVT MDII -B: Performed by: OBSTETRICS & GYNECOLOGY

## 2019-12-17 PROCEDURE — 82962 GLUCOSE BLOOD TEST: CPT

## 2019-12-17 PROCEDURE — 74011000250 HC RX REV CODE- 250: Performed by: NURSE ANESTHETIST, CERTIFIED REGISTERED

## 2019-12-17 PROCEDURE — 74011250636 HC RX REV CODE- 250/636: Performed by: NURSE ANESTHETIST, CERTIFIED REGISTERED

## 2019-12-17 PROCEDURE — 74011250636 HC RX REV CODE- 250/636

## 2019-12-17 PROCEDURE — 77030018842 HC SOL IRR SOD CL 9% BAXT -A: Performed by: OBSTETRICS & GYNECOLOGY

## 2019-12-17 PROCEDURE — 76010000138 HC OR TIME 0.5 TO 1 HR: Performed by: OBSTETRICS & GYNECOLOGY

## 2019-12-17 RX ORDER — DEXAMETHASONE SODIUM PHOSPHATE 4 MG/ML
4 INJECTION, SOLUTION INTRA-ARTICULAR; INTRALESIONAL; INTRAMUSCULAR; INTRAVENOUS; SOFT TISSUE ONCE
Status: COMPLETED | OUTPATIENT
Start: 2019-12-17 | End: 2019-12-17

## 2019-12-17 RX ORDER — PROPOFOL 10 MG/ML
INJECTION, EMULSION INTRAVENOUS AS NEEDED
Status: DISCONTINUED | OUTPATIENT
Start: 2019-12-17 | End: 2019-12-17 | Stop reason: HOSPADM

## 2019-12-17 RX ORDER — BUPIVACAINE HYDROCHLORIDE 2.5 MG/ML
INJECTION, SOLUTION EPIDURAL; INFILTRATION; INTRACAUDAL AS NEEDED
Status: DISCONTINUED | OUTPATIENT
Start: 2019-12-17 | End: 2019-12-17 | Stop reason: HOSPADM

## 2019-12-17 RX ORDER — DEXAMETHASONE SODIUM PHOSPHATE 4 MG/ML
INJECTION, SOLUTION INTRA-ARTICULAR; INTRALESIONAL; INTRAMUSCULAR; INTRAVENOUS; SOFT TISSUE AS NEEDED
Status: DISCONTINUED | OUTPATIENT
Start: 2019-12-17 | End: 2019-12-17 | Stop reason: HOSPADM

## 2019-12-17 RX ORDER — DOXYCYCLINE HYCLATE 100 MG
200 TABLET ORAL ONCE
Status: COMPLETED | OUTPATIENT
Start: 2019-12-17 | End: 2019-12-17

## 2019-12-17 RX ORDER — ONDANSETRON 2 MG/ML
4 INJECTION INTRAMUSCULAR; INTRAVENOUS ONCE
Status: COMPLETED | OUTPATIENT
Start: 2019-12-17 | End: 2019-12-17

## 2019-12-17 RX ORDER — IBUPROFEN 600 MG/1
600 TABLET ORAL
Qty: 36 TAB | Refills: 1 | Status: ON HOLD | OUTPATIENT
Start: 2019-12-17 | End: 2022-05-18

## 2019-12-17 RX ORDER — DOXYCYCLINE 100 MG/1
100 TABLET ORAL 2 TIMES DAILY
Qty: 6 TAB | Refills: 0 | Status: SHIPPED | OUTPATIENT
Start: 2019-12-17 | End: 2019-12-20

## 2019-12-17 RX ORDER — KETAMINE HYDROCHLORIDE 10 MG/ML
INJECTION, SOLUTION INTRAMUSCULAR; INTRAVENOUS AS NEEDED
Status: DISCONTINUED | OUTPATIENT
Start: 2019-12-17 | End: 2019-12-17 | Stop reason: HOSPADM

## 2019-12-17 RX ORDER — KETOROLAC TROMETHAMINE 30 MG/ML
INJECTION, SOLUTION INTRAMUSCULAR; INTRAVENOUS AS NEEDED
Status: DISCONTINUED | OUTPATIENT
Start: 2019-12-17 | End: 2019-12-17 | Stop reason: HOSPADM

## 2019-12-17 RX ORDER — SODIUM CHLORIDE 9 MG/ML
INJECTION INTRAMUSCULAR; INTRAVENOUS; SUBCUTANEOUS
Status: DISCONTINUED
Start: 2019-12-17 | End: 2019-12-17 | Stop reason: HOSPADM

## 2019-12-17 RX ORDER — FENTANYL CITRATE 50 UG/ML
INJECTION, SOLUTION INTRAMUSCULAR; INTRAVENOUS AS NEEDED
Status: DISCONTINUED | OUTPATIENT
Start: 2019-12-17 | End: 2019-12-17 | Stop reason: HOSPADM

## 2019-12-17 RX ORDER — SILVER NITRATE 38.21; 12.74 MG/1; MG/1
1 STICK TOPICAL ONCE
Status: CANCELLED | OUTPATIENT
Start: 2019-12-17 | End: 2019-12-17

## 2019-12-17 RX ORDER — OXYCODONE AND ACETAMINOPHEN 5; 325 MG/1; MG/1
1-2 TABLET ORAL
Qty: 24 TAB | Refills: 0 | Status: SHIPPED | OUTPATIENT
Start: 2019-12-17 | End: 2019-12-19

## 2019-12-17 RX ORDER — DEXAMETHASONE SODIUM PHOSPHATE 4 MG/ML
INJECTION, SOLUTION INTRA-ARTICULAR; INTRALESIONAL; INTRAMUSCULAR; INTRAVENOUS; SOFT TISSUE
Status: COMPLETED
Start: 2019-12-17 | End: 2019-12-17

## 2019-12-17 RX ORDER — ONDANSETRON 2 MG/ML
INJECTION INTRAMUSCULAR; INTRAVENOUS
Status: COMPLETED
Start: 2019-12-17 | End: 2019-12-17

## 2019-12-17 RX ORDER — ONDANSETRON 2 MG/ML
INJECTION INTRAMUSCULAR; INTRAVENOUS AS NEEDED
Status: DISCONTINUED | OUTPATIENT
Start: 2019-12-17 | End: 2019-12-17 | Stop reason: HOSPADM

## 2019-12-17 RX ORDER — METHYLERGONOVINE MALEATE 0.2 MG/1
0.2 TABLET ORAL EVERY 6 HOURS
Qty: 4 TAB | Refills: 0 | Status: SHIPPED | OUTPATIENT
Start: 2019-12-17 | End: 2019-12-18

## 2019-12-17 RX ORDER — BUPIVACAINE HYDROCHLORIDE 2.5 MG/ML
20 INJECTION, SOLUTION EPIDURAL; INFILTRATION; INTRACAUDAL ONCE
Status: CANCELLED | OUTPATIENT
Start: 2019-12-17 | End: 2019-12-17

## 2019-12-17 RX ORDER — SODIUM CHLORIDE, SODIUM LACTATE, POTASSIUM CHLORIDE, CALCIUM CHLORIDE 600; 310; 30; 20 MG/100ML; MG/100ML; MG/100ML; MG/100ML
INJECTION, SOLUTION INTRAVENOUS
Status: DISCONTINUED | OUTPATIENT
Start: 2019-12-17 | End: 2019-12-17 | Stop reason: HOSPADM

## 2019-12-17 RX ORDER — MIDAZOLAM HYDROCHLORIDE 1 MG/ML
INJECTION, SOLUTION INTRAMUSCULAR; INTRAVENOUS AS NEEDED
Status: DISCONTINUED | OUTPATIENT
Start: 2019-12-17 | End: 2019-12-17 | Stop reason: HOSPADM

## 2019-12-17 RX ORDER — LIDOCAINE HYDROCHLORIDE 20 MG/ML
INJECTION, SOLUTION EPIDURAL; INFILTRATION; INTRACAUDAL; PERINEURAL AS NEEDED
Status: DISCONTINUED | OUTPATIENT
Start: 2019-12-17 | End: 2019-12-17 | Stop reason: HOSPADM

## 2019-12-17 RX ORDER — ROCURONIUM BROMIDE 10 MG/ML
INJECTION, SOLUTION INTRAVENOUS AS NEEDED
Status: DISCONTINUED | OUTPATIENT
Start: 2019-12-17 | End: 2019-12-17 | Stop reason: HOSPADM

## 2019-12-17 RX ORDER — SUCCINYLCHOLINE CHLORIDE 20 MG/ML
INJECTION INTRAMUSCULAR; INTRAVENOUS AS NEEDED
Status: DISCONTINUED | OUTPATIENT
Start: 2019-12-17 | End: 2019-12-17 | Stop reason: HOSPADM

## 2019-12-17 RX ADMIN — DOXYCYCLINE HYCLATE 200 MG: 100 TABLET, COATED ORAL at 10:16

## 2019-12-17 RX ADMIN — KETOROLAC TROMETHAMINE 30 MG: 30 INJECTION, SOLUTION INTRAMUSCULAR; INTRAVENOUS at 10:39

## 2019-12-17 RX ADMIN — LIDOCAINE HYDROCHLORIDE 70 MG: 20 INJECTION, SOLUTION EPIDURAL; INFILTRATION; INTRACAUDAL; PERINEURAL at 10:37

## 2019-12-17 RX ADMIN — FENTANYL CITRATE 100 MCG: 50 INJECTION, SOLUTION INTRAMUSCULAR; INTRAVENOUS at 10:31

## 2019-12-17 RX ADMIN — ONDANSETRON HYDROCHLORIDE 4 MG: 2 INJECTION, SOLUTION INTRAMUSCULAR; INTRAVENOUS at 10:37

## 2019-12-17 RX ADMIN — ONDANSETRON 4 MG: 2 INJECTION INTRAMUSCULAR; INTRAVENOUS at 12:30

## 2019-12-17 RX ADMIN — DEXAMETHASONE SODIUM PHOSPHATE 4 MG: 4 INJECTION, SOLUTION INTRA-ARTICULAR; INTRALESIONAL; INTRAMUSCULAR; INTRAVENOUS; SOFT TISSUE at 12:30

## 2019-12-17 RX ADMIN — SODIUM CHLORIDE, POTASSIUM CHLORIDE, SODIUM LACTATE AND CALCIUM CHLORIDE: 600; 310; 30; 20 INJECTION, SOLUTION INTRAVENOUS at 10:45

## 2019-12-17 RX ADMIN — MIDAZOLAM 2 MG: 1 INJECTION INTRAMUSCULAR; INTRAVENOUS at 10:31

## 2019-12-17 RX ADMIN — DEXAMETHASONE SODIUM PHOSPHATE 4 MG: 4 INJECTION, SOLUTION INTRAMUSCULAR; INTRAVENOUS at 10:35

## 2019-12-17 RX ADMIN — ROCURONIUM BROMIDE 5 MG: 10 SOLUTION INTRAVENOUS at 10:37

## 2019-12-17 RX ADMIN — SUCCINYLCHOLINE CHLORIDE 170 MG: 20 INJECTION, SOLUTION INTRAMUSCULAR; INTRAVENOUS at 10:37

## 2019-12-17 RX ADMIN — MEPERIDINE HYDROCHLORIDE 25 MG: 25 INJECTION INTRAMUSCULAR; INTRAVENOUS; SUBCUTANEOUS at 11:23

## 2019-12-17 RX ADMIN — PROPOFOL 200 MG: 10 INJECTION, EMULSION INTRAVENOUS at 10:37

## 2019-12-17 RX ADMIN — KETAMINE HYDROCHLORIDE 30 MG: 10 INJECTION, SOLUTION INTRAMUSCULAR; INTRAVENOUS at 10:35

## 2019-12-17 RX ADMIN — DEXAMETHASONE SODIUM PHOSPHATE 4 MG: 4 INJECTION, SOLUTION INTRAMUSCULAR; INTRAVENOUS at 12:30

## 2019-12-17 NOTE — ANESTHESIA POSTPROCEDURE EVALUATION
Post-Anesthesia Evaluation and Assessment    Patient: Miguel Ángel Milner MRN: [id number]  SSN: [id number]    YOB: 1980  Age: 44 y.o. Sex: female      I have evaluated the patient and they are stable and ready for discharge from the PACU. Cardiovascular Function/Vital Signs  Visit Vitals  /65   Pulse 78   Temp 36.9 °C (98.5 °F)   Resp 19   Ht 5' 7\" (1.702 m)   Wt 122.9 kg (271 lb)   SpO2 100%   BMI 42.44 kg/m²       Patient is status post Other anesthesia for Procedure(s):  DILATATION AND CURETTAGE WITH SUCTION (CHOICE). Nausea/Vomiting: None    Postoperative hydration reviewed and adequate. Pain:  Pain Scale 1: Numeric (0 - 10) (12/17/19 1125)  Pain Intensity 1: 0 (12/17/19 1125)   Managed    Neurological Status:   Neuro (WDL): Within Defined Limits (12/17/19 1125)   At baseline    Mental Status, Level of Consciousness: Alert and  oriented to person, place, and time    Pulmonary Status:   O2 Device: Nasal cannula (12/17/19 1125)   Adequate oxygenation and airway patent    Complications related to anesthesia: None    Post-anesthesia assessment completed. No concerns    Signed By: Oly Borja MD     December 17, 2019              Procedure(s):  DILATATION AND CURETTAGE WITH SUCTION (CHOICE). general    <BSHSIANPOST>    Vitals Value Taken Time   /65 12/17/2019 11:30 AM   Temp 36.9 °C (98.5 °F) 12/17/2019 11:25 AM   Pulse 76 12/17/2019 11:44 AM   Resp 19 12/17/2019 11:44 AM   SpO2 100 % 12/17/2019 11:44 AM   Vitals shown include unvalidated device data.

## 2019-12-17 NOTE — H&P
Below H&P performed by Dr. Zarina Ellison and reviewed by me. No changes. Patient is @ 7 completed weeks with 2 US showing only a GS with no fetal pole or yolk sac. Patient has opted for surgical management. Reviewed risks of procedure with pt this AM.  Questions answered. Will proceed with suction d&c as scheduled with no genetics. Established Patient, 12-  GYN US then Problem visit with Zarina Ellison  Performed by Candi Rodriguez MD, OB/GYN, [phone]  Reason for Visit  GYN problem  Assessment & Plan  missed miscarriage  Reviewed US confirms diagnosis of SAB. Reviewed D&C suction procedure with patient. Reviewed risk of bleeding, infection, damage to uterus and other organs. Reviewed will schedule at [facility]. RH POS. NPO after midnight. History of Present Illness  44year old A3 patient presents for an US follow up for a SAB. Initial US 12/3 with no yolk sac or fetal pole seen. Pt had a repeat US today that showed the same. She has no bleeding or spotting but she is having some cramping. Pt would prefer to have a D&C over expectant or medical management given her work/school schedule. Review of Systems  Adult Female Brief  Reported by Patient  Except as noted in the HPI, the review of systems is negative for General, Breast, , Resp, GI, CV, Endo, MS, Psych and Heme.   Screening  None recorded  Physical Exam  Adult Female Extended    Constitutional  General Appearance: pleasant, well developed, well nourished  Level of Distress: no acute distress  Ambulation: ambulating normally    Head  Head: normocephalic    Eyes  Extraocular Movements extraocular movements intact    Ears, Nose, Mouth, Throat  Ears normal hearing  Nose: no external nose lesions    Lungs / Chest  Respiratory effort: unlabored    Cardiovascular  Extremities: no clubbing, no cyanosis, no edema    Skin  General Skin no rash, no suspicious lesions    Neurologic  Gait and Station: normal gait  Sensation: (grossly intact)  Motor: (grossly intact)    Mental Status Exam  Orientation oriented to person, oriented to place, oriented to time

## 2019-12-17 NOTE — DISCHARGE INSTRUCTIONS
OUTPATIENT DEPARTMENT DISCHARGE SUMMARY from Nurse    The following personal items collected during your admission are returned to you:          All personal belongings returned to patient in PACU      PATIENT INSTRUCTIONS:    Follow-up Appointments   Procedures    FOLLOW UP VISIT Appointment in: [place] Dr. Griselda Lea Dr. Griselda Lea     Standing Status:   Standing     Number of Occurrences:   1     Order Specific Question:   Appointment in     Answer: Two Weeks       What to do at Home:  Recommended activity: Activity as tolerated and no driving for today, No Driving for 24 hours and/or while taking narcotic pain medication; Showers only, not tub baths; Nothing per vagina--no douching, tampons, intercourse, until ok'd by Dr. Griselda Lea at follow up appointment    If you experience any of the following symptoms Fever >100 or if you soak a germain pad in one hour for two hours straight, please follow up with Dr. Griselda Lea. The discharge information has been reviewed with the patient. The patient verbalized understanding.      ______________________________________________________________________    Anesthesia Discharge Instructions    After general anesthesia or intervenous sedation, for 24 hours or while taking prescription Narcotics:  · Limit your activities  · Do not drive or operate hazardous machinery  · If you have not urinated within 8 hours after discharge, please contact your surgeon on call. · Do not make important personal or business decisions  · Do not drink alcoholic beverages    Report the following to your surgeon:  · Excessive pain, swelling, redness or odor of or around the surgical area  · Temperature over 100.5 degrees  · Nausea and vomiting lasting longer than 4 hours or if unable to take medication  · Any signs of decreased circulation or nerve impairment to extremity:  Change in color, persistent numbness, tingling, coldness or increased pain.   · Any questions

## 2019-12-17 NOTE — OP NOTES
D & C OPERATIVE NOTE    Date of Procedure: 12/17/2019   Preoperative Diagnosis: MISSED AB, 7 wk GS  Postoperative Diagnosis: MISSED AB    Procedure: Procedure(s):  DILATATION AND CURETTAGE WITH SUCTION (CHOICE)    Surgeon: Danilo Sharma MD  Assistant(s): none   Anesthesia: Other   Estimated Blood Loss: minimal  Specimens:   ID Type Source Tests Collected by Time Destination   1 : production of conception Fresh Uterus  Dinora Covarrubias MD 12/17/2019 1047 Pathology      Findings: See full operative note. Complications: none noted intraoperatively  Implants: * No implants in log *    DESCRIPTION OF PROCEDURE: The patient was placed on the operating room table in the supine position and placed under general endotracheal anesthesia. Time out was done to confirm the operating procedure, surgeon, patient and site. Once confirmed by the team, procedure was started. PROCEDURE: Patient was placed on the operating table & after induction of anesthesia she was placed in the dorsal lithotomy position and prepped and draped in the usual fashion for vaginal surgery. Cervix was exposed with a bivalved vaginal speculum and grasped with a single-tooth tenaculum. 0.25% plain marcain was used for a paracervical block. A curved 9 suction curette device was then introduced into the endometrial cavity after it was sounded to approximately 9 cm. Thorough suction curettage followed by sharp curettage with a large curette followed again by suction curettage was performed until the suction returned no further clot or products of conception. Adequate curettage was felt to be obtained. Hemostasis appeared normal with silver nitrate. Instruments were removed. The patient went to the recovery room in satisfactory condition. All counts were correct times two.     Of note blood type was RH positive    Danilo Sharma MD

## 2019-12-17 NOTE — ANESTHESIA PREPROCEDURE EVALUATION
Relevant Problems   No relevant active problems       Anesthetic History   No history of anesthetic complications            Review of Systems / Medical History  Patient summary reviewed, nursing notes reviewed and pertinent labs reviewed    Pulmonary  Within defined limits                 Neuro/Psych         Psychiatric history     Cardiovascular                  Exercise tolerance: >4 METS     GI/Hepatic/Renal                Endo/Other    Diabetes    Morbid obesity     Other Findings   Comments: Missed AB           Physical Exam    Airway  Mallampati: III    Neck ROM: normal range of motion   Mouth opening: Normal     Cardiovascular    Rhythm: regular  Rate: normal         Dental  No notable dental hx       Pulmonary  Breath sounds clear to auscultation               Abdominal         Other Findings            Anesthetic Plan    ASA: 2  Anesthesia type: general          Induction: Intravenous and RSI  Anesthetic plan and risks discussed with: Patient

## 2022-03-18 PROBLEM — E66.01 OBESITY, MORBID (HCC): Status: ACTIVE | Noted: 2018-03-16

## 2022-03-19 PROBLEM — R10.9 ABDOMINAL PAIN AFFECTING PREGNANCY: Status: ACTIVE | Noted: 2019-02-27

## 2022-03-19 PROBLEM — O26.899 ABDOMINAL PAIN AFFECTING PREGNANCY: Status: ACTIVE | Noted: 2019-02-27

## 2022-03-19 PROBLEM — E55.9 VITAMIN D DEFICIENCY: Status: ACTIVE | Noted: 2018-04-16

## 2022-03-19 PROBLEM — E78.00 ELEVATED LDL CHOLESTEROL LEVEL: Status: ACTIVE | Noted: 2018-04-16

## 2022-03-19 PROBLEM — F41.9 ANXIETY AND DEPRESSION: Status: ACTIVE | Noted: 2018-03-16

## 2022-03-19 PROBLEM — F32.A ANXIETY AND DEPRESSION: Status: ACTIVE | Noted: 2018-03-16

## 2022-03-19 PROBLEM — Z3A.39 39 WEEKS GESTATION OF PREGNANCY: Status: ACTIVE | Noted: 2019-02-27

## 2022-03-19 PROBLEM — R73.03 PREDIABETES: Status: ACTIVE | Noted: 2018-04-16

## 2022-03-29 ENCOUNTER — TELEPHONE (OUTPATIENT)
Dept: SURGERY | Age: 42
End: 2022-03-29

## 2022-03-30 NOTE — TELEPHONE ENCOUNTER
Attempted to contact the patient to reschedule bariatric surgery consult. Busy signal again. Will send an email.

## 2022-04-05 NOTE — TELEPHONE ENCOUNTER
Called patient in attempt to reschedule bariatric consultation for today 4/5/2022. No answer, left message advising to return our call.

## 2022-04-25 ENCOUNTER — OFFICE VISIT (OUTPATIENT)
Dept: SURGERY | Age: 42
End: 2022-04-25
Payer: MEDICAID

## 2022-04-25 VITALS
DIASTOLIC BLOOD PRESSURE: 90 MMHG | OXYGEN SATURATION: 98 % | HEIGHT: 67 IN | SYSTOLIC BLOOD PRESSURE: 136 MMHG | TEMPERATURE: 98.7 F | BODY MASS INDEX: 45.99 KG/M2 | HEART RATE: 79 BPM | WEIGHT: 293 LBS | RESPIRATION RATE: 18 BRPM

## 2022-04-25 DIAGNOSIS — E66.01 MORBID OBESITY WITH BMI OF 45.0-49.9, ADULT (HCC): Primary | ICD-10-CM

## 2022-04-25 DIAGNOSIS — K21.9 GASTROESOPHAGEAL REFLUX DISEASE, UNSPECIFIED WHETHER ESOPHAGITIS PRESENT: ICD-10-CM

## 2022-04-25 PROCEDURE — 99204 OFFICE O/P NEW MOD 45 MIN: CPT | Performed by: SURGERY

## 2022-04-25 RX ORDER — DULAGLUTIDE 1.5 MG/.5ML
1.5 INJECTION, SOLUTION SUBCUTANEOUS
COMMUNITY

## 2022-04-25 RX ORDER — VALSARTAN 160 MG/1
160 TABLET ORAL DAILY
COMMUNITY

## 2022-04-25 RX ORDER — LORATADINE 10 MG/1
10 TABLET ORAL AS NEEDED
COMMUNITY

## 2022-04-25 NOTE — PROGRESS NOTES
1. Have you been to the ER, urgent care clinic since your last visit? Hospitalized since your last visit? No    2. Have you seen or consulted any other health care providers outside of the [address] [place] [address] since your last visit? Include any pap smears or colon screening.  No

## 2022-04-25 NOTE — LETTER
4/25/2022    Patient: Zane Carias   YOB: 1980   Date of Visit: 4/25/2022     Ora Lopez NP  [address] [facility] [address]  [place]    Dear Ora Lopez NP,      Thank you for referring Ms. Mell Zaidi to [facility] [address] [facility] for evaluation. My notes for this consultation are attached. If you have questions, please do not hesitate to call me. I look forward to following your patient along with you.       Sincerely,    Gaudencio Lozano MD

## 2022-04-25 NOTE — PROGRESS NOTES
Bariatric Surgery Consult    Patsy Rivera is a 39 y.o. female with a history of morbid obesity. Her Height: 5' 7\" (170.2 cm), Weight: 305 lb 9.6 oz (138.6 kg). Body mass index is 47.86 kg/m². She reports that she has been trying to lose weight for 5 years. Her maximum weight was 305 pounds. She has attended our bariatric surgery information seminar. Franceen Severance wants to consider laparoscopic sleeve gastrectomy. Pt is referred by:  Man Flowers NP. Dietary History:   The patient says that in the past, physician supervised, behavior modification and unsupervised diets have not resulted in real success. When asked why she was not able to achieve or maintain significant weight loss she replied, \"She has tried to lose weight many times losing about 20 pounds without any long-term success\". Number of meals per day: 3  Portion size: moderate  Snacks: She is often snacking on sweet treats routinely, she does drink occasionally alcohol and wine  Comorbidities:     Bariatric comorbidities present: hypertension, non-insulin dependent diabetes, GERD and obstructive sleep apnea    Ambulatory status: independent    The patient's reported level of exercise: moderately active.       Patient Active Problem List    Diagnosis Date Noted    39 weeks gestation of pregnancy 02/27/2019    Abdominal pain affecting pregnancy 02/27/2019    Prediabetes 04/16/2018    Vitamin D deficiency 04/16/2018    Elevated LDL cholesterol level 04/16/2018    Obesity, morbid ([address] 03/16/2018    Anxiety and depression 03/16/2018     Past Medical History:   Diagnosis Date    Anemia     Chlamydia     hx previous pregnancy    Depression      hx  anxiety    Disease of blood and blood forming organ     Gestational diabetes     Taking metformin 500mg BID      Past Surgical History:   Procedure Laterality Date    HX OTHER SURGICAL  2016    UNRULY      Social History     Tobacco Use    Smoking status: Former Smoker     Packs/day: 0.25     Quit date: 2019     Years since quittin.4    Smokeless tobacco: Never Used    Tobacco comment: black and mild once in awhile- NO CIGERETTES   Substance Use Topics    Alcohol use: Yes     Comment: social      Family History   Problem Relation Age of Onset    Diabetes Mother     Hypertension Mother     Diabetes Father     Hypertension Father       . Current Outpatient Medications   Medication Sig    dulaglutide (Trulicity) 1.5 XV/7.2 mL sub-q pen 1.5 mg by SubCUTAneous route every seven (7) days.  loratadine (CLARITIN) 10 mg tablet Take 10 mg by mouth as needed for Allergies.  valsartan (DIOVAN) 160 mg tablet Take 160 mg by mouth daily.  ibuprofen (MOTRIN) 600 mg tablet Take 1 Tab by mouth every eight (8) hours as needed for Pain. (Patient not taking: Reported on 2022)     No current facility-administered medications for this visit.       No Known Allergies      Review of Systems:    Constitutional: negative  Ears, Nose, Mouth, Throat, and Face: negative  Respiratory: negative  Cardiovascular: negative  Gastrointestinal: positive for Occasional GERD symptoms  Genitourinary:negative  Integument/Breast: negative  Hematologic/Lymphatic: negative  Musculoskeletal:positive for stiff joints and back pain  Neurological: negative  Behavioral/Psychiatric: negative  Endocrine: negative  Allergic/Immunologic: negative    Objective:     Visit Vitals  BP (!) 136/90 (BP 1 Location: Left arm, BP Patient Position: Sitting, BP Cuff Size: Large adult)   Pulse 79   Temp 98.7 °F (37.1 °C) (Oral)   Resp 18   Ht 5' 7\" (1.702 m)   Wt 305 lb 9.6 oz (138.6 kg)   SpO2 98%   BMI 47.86 kg/m²        Physical Exam:    General:  alert, no distress, morbidly obese   Eyes:  conjunctivae and sclerae normal, pupils equal, round, reactive to light, extraocular movements intact without nystagmus   Throat & Neck: no erythema or exudates noted and neck supple and symmetrical; no palpable masses   Lungs:   clear to auscultation bilaterally   Heart:  Regular rate and rhythm   Abdomen:   obese, soft, nontender, nondistended, no masses or organomegaly,    Extremities: no edema,  no gait disturbances   Skin: Normal.       Assessment:     1. Morbid obesity (Body mass index is 47.86 kg/m².) with multiple comorbidities. The patient meets criteria established by the NIH for weight loss surgery candidates. Without weight reduction, co-morbidities will escalate as well as increase risk of early mortality. Our recommendation is the patient could be served with laparoscopic sleeve gastrectomy. I explained to the patient differences between laparoscopic gastric bypass, laparoscopic adjustable gastric banding, and laparoscopic vertical sleeve gastrectomy with respect to expected weight loss, resolution of comorbidities and risks. Ms. Chaz Galvan has attended one our informational meetings and has seen our educational materials. She has requested Dr. Chaz Galvan to perform her procedure. I reviewed the role for this procedure as a tool to help her achieve her weight loss goals. I reminded her that effective weight loss comes from lifelong adherence to changes in dietary choices, eating habits and exercise. Recommendation: We will request approval for laparoscopic sleeve gastrectomy. We recommend that the patient undergo the following evaluations prior to considering surgery:    Cardiology: no  Dietician: yes  Gastroenterology: yes  Psychiatry/Psychology: yes  Pulmonology: no  Sleep Medicine: no    She overall appears to be a good candidate for sleeve gastrectomy. She does have some occasional GERD and I will set her up for EGD and upper GI prior to surgery. She has diabetes but is only on Trulicity currently. I would like her to lose 5 to 10 pounds prior to surgery as well. She will need to start her nutrition and psychological evaluation as well. I will see her back after she has completed these.     Signed By: Steph Sosa MD     April 25, 2022 Greater than half of the time: 45 minutes was used in counciling the patient about bariatric surgery and the steps she needs to take to move forward with her surgery. Ms. Chaz Galvan has a reminder for a \"due or due soon\" health maintenance. I have asked that she contact her primary care provider for follow-up on this health maintenance.

## 2022-04-26 ENCOUNTER — DOCUMENTATION ONLY (OUTPATIENT)
Dept: SURGERY | Age: 42
End: 2022-04-26

## 2022-04-26 NOTE — PROGRESS NOTES
[facility]. The office will call patient to schedule appointment for the EGD.

## 2022-04-28 ENCOUNTER — CLINICAL SUPPORT (OUTPATIENT)
Dept: SURGERY | Age: 42
End: 2022-04-28

## 2022-04-28 DIAGNOSIS — E66.01 MORBID OBESITY (HCC): Primary | ICD-10-CM

## 2022-04-28 NOTE — PROGRESS NOTES
Pre-operative Bariatric Nutrition Evaluation    Date: 2022              Session 1 of 6    Insurance: [employer]           Physician/Surgeon: Dr. Neal Galindo      Name: Vasquez Olea  :  1980  Age:  39  Gender: Female  Type of Surgery: []   Gastric Bypass   [x]    Sleeve Gastrectomy    ASSESSMENT:    Past Medical History: Pre-DM, anxiety/depression     Medications/Supplements:   Prior to Admission medications    Medication Sig Start Date End Date Taking? Authorizing Provider   dulaglutide (Trulicity) 1.5 WC/0.4 mL sub-q pen 1.5 mg by SubCUTAneous route every seven (7) days. Provider, Historical   loratadine (CLARITIN) 10 mg tablet Take 10 mg by mouth as needed for Allergies. Provider, Historical   valsartan (DIOVAN) 160 mg tablet Take 160 mg by mouth daily. Provider, Historical   ibuprofen (MOTRIN) 600 mg tablet Take 1 Tab by mouth every eight (8) hours as needed for Pain. Patient not taking: Reported on 19   Marcos Rodriguez MD       Smoking: yes  Alcohol: None    Food Allergies/Intolerances: None    Anthropometrics:    Ht:67 inches   Wt: 305 lbs   IBW: 135 lbs    %IBW: 225     BMI:47    Category: Obesity class III    Reported wt history: Pt presents today for pre-op nutrition evaluation for wt loss surgery. Reports lowest adult BW of 160 lbs and highest adult BW of 307 lbs . Attributes wt gain over the years r/t poor eating habits, large portions . Has attempted wt loss through various methods with most successful wt loss of 20 lbs. Has been unable to maintain long term or significant wt loss and is now seeking approval for weight loss surgery. Pt will need to complete 6 months of supervised weight loss for insurance requirements. Surgeon recommending 10 lb wt loss before surgery.      Exercise/Physical Activity: None    Reported Diet History: low carb diet    24 Hour Diet Recall  Breakfast When off work 4 mini sausage, eggs   Snacks     Lunch  Skips- sleeping   Snacks Dinner  [place]  [place] [facility] wings, cabbage or spaghetti or tacos or baked chix or pork chops   Snacks During night-when working sweets   Beverages  Water, OJ   Eating out/take out- rarely. No emotional eating    Environment/Psychosocial/Support: Pt states no support system- family not supportive; has a coworker who has had bariatric surgery. 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 NUTRITION INTERVENTION:  Pt educated on nutrition recommendations for weight loss surgery, specifically sleeve gastrectomy. Instructed on consuming 3 meals per day starting now. Use the balanced plate method to plan meals, include 3 oz of lean source of protein, 1/2 cup whole grains, unlimited non-starchy vegetables, 1/2 cup fruit and 1 serving of low fat dairy. Utilize handouts listing healthy snack and meal ideas to limit restaurant meals. After surgery measure all meals to 1/2 cup. Each meal will contain a 1/4 cup lean protein and 1/4 cup fruit, non-starchy vegetable or starch (limiting to once per day). Aim for 60 g protein per day. Sip on 48-64 oz of sugar free, calorie free, non-carbonated beverages each day. Do not use a straw. Do not consume beverages 30 minutes before, during or 30 minutes after meals. Read all nutrition labels. Demonstrated and emphasized identifying serving size, total fat, sugar and protein content. Defined low fat as </= 3 g per serving. Discussed lean and extra lean sources of protein. Provided list of low fat cooking methods. Avoid foods with sugar listed in the first 3 ingredients and >/15 g sugar per serving. Excess sugar/fat intake may lead to dumping syndrome. Discussed signs and symptoms of dumping syndrome.      Practice mindful eating habits; take small bites, chew thoroughly, avoid distractions, utilize hunger/fullness scale. Consume meals over 20-30 minutes. Attend Bariatric Support Group and increase physical activity (approved per MD) for long term weight maintenance. NUTRITION MONITORING AND EVALUATION:    The following goals were established with patient;  1. Try protein shakes from list  2. No fast food or sweets- sub in healthy snacks  3. Review nutrition education materials. Follow up next month for continue nutrition education    Specific tips and techniques to facilitate compliance with above recommendations were provided and discussed. Nutrition evaluation reveals important lifestyle changes are indicated. Goals set and recommendations made. Will continue to assess. If further details are desired please feel free to contact me at [phone]. This phone number was also provided to the patient for any further questions or concerns.            Sher Swartz RD

## 2022-05-10 ENCOUNTER — HOSPITAL ENCOUNTER (OUTPATIENT)
Dept: GENERAL RADIOLOGY | Age: 42
Discharge: HOME | End: 2022-05-10
Attending: SURGERY
Payer: MEDICAID

## 2022-05-10 DIAGNOSIS — E66.01 MORBID OBESITY WITH BMI OF 45.0-49.9, ADULT (HCC): ICD-10-CM

## 2022-05-10 DIAGNOSIS — K21.9 GASTROESOPHAGEAL REFLUX DISEASE, UNSPECIFIED WHETHER ESOPHAGITIS PRESENT: ICD-10-CM

## 2022-05-10 PROCEDURE — 74240 X-RAY XM UPR GI TRC 1CNTRST: CPT

## 2022-05-18 ENCOUNTER — ANESTHESIA (OUTPATIENT)
Dept: ENDOSCOPY | Age: 42
End: 2022-05-18
Payer: MEDICAID

## 2022-05-18 ENCOUNTER — ANESTHESIA EVENT (OUTPATIENT)
Dept: ENDOSCOPY | Age: 42
End: 2022-05-18
Payer: MEDICAID

## 2022-05-18 ENCOUNTER — HOSPITAL ENCOUNTER (OUTPATIENT)
Age: 42
Setting detail: OUTPATIENT SURGERY
Discharge: HOME | End: 2022-05-18
Attending: INTERNAL MEDICINE | Admitting: INTERNAL MEDICINE
Payer: MEDICAID

## 2022-05-18 VITALS
DIASTOLIC BLOOD PRESSURE: 81 MMHG | HEIGHT: 67 IN | HEART RATE: 88 BPM | BODY MASS INDEX: 45.99 KG/M2 | OXYGEN SATURATION: 98 % | RESPIRATION RATE: 22 BRPM | TEMPERATURE: 98.4 F | WEIGHT: 293 LBS | SYSTOLIC BLOOD PRESSURE: 116 MMHG

## 2022-05-18 LAB — HCG UR QL: NEGATIVE

## 2022-05-18 PROCEDURE — 76060000031 HC ANESTHESIA FIRST 0.5 HR: Performed by: INTERNAL MEDICINE

## 2022-05-18 PROCEDURE — 74011250636 HC RX REV CODE- 250/636: Performed by: NURSE ANESTHETIST, CERTIFIED REGISTERED

## 2022-05-18 PROCEDURE — 81025 URINE PREGNANCY TEST: CPT

## 2022-05-18 PROCEDURE — 2709999900 HC NON-CHARGEABLE SUPPLY: Performed by: INTERNAL MEDICINE

## 2022-05-18 PROCEDURE — 74011000250 HC RX REV CODE- 250: Performed by: NURSE ANESTHETIST, CERTIFIED REGISTERED

## 2022-05-18 PROCEDURE — 77030039825 HC MSK NSL PAP SUPERNO2VA VYRM -B: Performed by: NURSE ANESTHETIST, CERTIFIED REGISTERED

## 2022-05-18 PROCEDURE — 76040000019: Performed by: INTERNAL MEDICINE

## 2022-05-18 RX ORDER — SODIUM CHLORIDE 0.9 % (FLUSH) 0.9 %
5-40 SYRINGE (ML) INJECTION AS NEEDED
Status: DISCONTINUED | OUTPATIENT
Start: 2022-05-18 | End: 2022-05-18 | Stop reason: HOSPADM

## 2022-05-18 RX ORDER — LIDOCAINE HYDROCHLORIDE 20 MG/ML
INJECTION, SOLUTION EPIDURAL; INFILTRATION; INTRACAUDAL; PERINEURAL AS NEEDED
Status: DISCONTINUED | OUTPATIENT
Start: 2022-05-18 | End: 2022-05-18 | Stop reason: HOSPADM

## 2022-05-18 RX ORDER — DEXTROMETHORPHAN/PSEUDOEPHED 2.5-7.5/.8
1.2 DROPS ORAL
Status: DISCONTINUED | OUTPATIENT
Start: 2022-05-18 | End: 2022-05-18 | Stop reason: HOSPADM

## 2022-05-18 RX ORDER — SODIUM CHLORIDE 0.9 % (FLUSH) 0.9 %
5-40 SYRINGE (ML) INJECTION EVERY 8 HOURS
Status: DISCONTINUED | OUTPATIENT
Start: 2022-05-18 | End: 2022-05-18 | Stop reason: HOSPADM

## 2022-05-18 RX ORDER — ATROPINE SULFATE 0.1 MG/ML
0.5 INJECTION INTRAVENOUS
Status: DISCONTINUED | OUTPATIENT
Start: 2022-05-18 | End: 2022-05-18 | Stop reason: HOSPADM

## 2022-05-18 RX ORDER — SODIUM CHLORIDE 9 MG/ML
50 INJECTION, SOLUTION INTRAVENOUS CONTINUOUS
Status: DISCONTINUED | OUTPATIENT
Start: 2022-05-18 | End: 2022-05-18 | Stop reason: HOSPADM

## 2022-05-18 RX ORDER — FLUMAZENIL 0.1 MG/ML
0.2 INJECTION INTRAVENOUS
Status: DISCONTINUED | OUTPATIENT
Start: 2022-05-18 | End: 2022-05-18 | Stop reason: HOSPADM

## 2022-05-18 RX ORDER — SODIUM CHLORIDE 9 MG/ML
INJECTION, SOLUTION INTRAVENOUS
Status: DISCONTINUED | OUTPATIENT
Start: 2022-05-18 | End: 2022-05-18 | Stop reason: HOSPADM

## 2022-05-18 RX ORDER — FENTANYL CITRATE 50 UG/ML
25-200 INJECTION, SOLUTION INTRAMUSCULAR; INTRAVENOUS
Status: DISCONTINUED | OUTPATIENT
Start: 2022-05-18 | End: 2022-05-18 | Stop reason: HOSPADM

## 2022-05-18 RX ORDER — EPINEPHRINE 0.1 MG/ML
1 INJECTION INTRACARDIAC; INTRAVENOUS
Status: DISCONTINUED | OUTPATIENT
Start: 2022-05-18 | End: 2022-05-18 | Stop reason: HOSPADM

## 2022-05-18 RX ORDER — PROPOFOL 10 MG/ML
INJECTION, EMULSION INTRAVENOUS AS NEEDED
Status: DISCONTINUED | OUTPATIENT
Start: 2022-05-18 | End: 2022-05-18 | Stop reason: HOSPADM

## 2022-05-18 RX ORDER — MIDAZOLAM HYDROCHLORIDE 1 MG/ML
.25-5 INJECTION, SOLUTION INTRAMUSCULAR; INTRAVENOUS
Status: DISCONTINUED | OUTPATIENT
Start: 2022-05-18 | End: 2022-05-18 | Stop reason: HOSPADM

## 2022-05-18 RX ORDER — NALOXONE HYDROCHLORIDE 0.4 MG/ML
0.4 INJECTION, SOLUTION INTRAMUSCULAR; INTRAVENOUS; SUBCUTANEOUS
Status: DISCONTINUED | OUTPATIENT
Start: 2022-05-18 | End: 2022-05-18 | Stop reason: HOSPADM

## 2022-05-18 RX ADMIN — PROPOFOL 200 MG: 10 INJECTION, EMULSION INTRAVENOUS at 08:05

## 2022-05-18 RX ADMIN — SODIUM CHLORIDE: 900 INJECTION, SOLUTION INTRAVENOUS at 08:02

## 2022-05-18 RX ADMIN — PROPOFOL 30 MG: 10 INJECTION, EMULSION INTRAVENOUS at 08:07

## 2022-05-18 RX ADMIN — LIDOCAINE HYDROCHLORIDE 80 MG: 20 INJECTION, SOLUTION EPIDURAL; INFILTRATION; INTRACAUDAL; PERINEURAL at 08:05

## 2022-05-18 NOTE — PROCEDURES
[address] [place] [address], [address] [place] [address]        [facility]- Gastroduodenoscopy (EGD) Procedure Note    Vasquez Olea  1980  [id number]      Procedure: Endoscopic Gastroduodenoscopy --diagnostic    Indication:  GERD , prop before gastric sleeve surgery by Dr Neal Galindo    Pre-operative Diagnosis: see indication above    Post-operative Diagnosis: see findings below    : Tam Keene MD    Surgical Assistant: Endoscopy Technician-1: Sandeep Ndiaye  Endoscopy RN-1: Griselda Caraway, RN    Implants:  None    Referring Provider:  Loanne Curling, MD      Anesthesia/Sedation:  MAC anesthesia Propofol        Procedure Details     After infomed consent was obtained for the procedure, with all risks and benefits of procedure explained the patient was taken to the endoscopy suite and placed in the left lateral decubitus position. Following sequential administration of sedation as per above, the endoscope was inserted into the mouth and advanced under direct vision to third portion of the duodenum. A careful inspection was made as the gastroscope was withdrawn, including a retroflexed view of the proximal stomach; findings and interventions are described below. Findings:   Esophagus:hiatal hernia 2 cm in size   Stomach: normal   Duodenum: normal      Therapies:  none    Specimens: none         EBL: None      Complications:   None; patient tolerated the procedure well. Impression:    -See post-procedure diagnoses.     Recommendations:  -Follow up with me as needed  -f/u with her surgeon, Dr Robin Rosa By: Tam Keene MD     5/18/2022  8:13 AM

## 2022-05-18 NOTE — ANESTHESIA PREPROCEDURE EVALUATION
Relevant Problems   No relevant active problems       Anesthetic History   No history of anesthetic complications            Review of Systems / Medical History  Patient summary reviewed, nursing notes reviewed and pertinent labs reviewed    Pulmonary  Within defined limits                 Neuro/Psych   Within defined limits           Cardiovascular    Hypertension: well controlled                   GI/Hepatic/Renal  Within defined limits              Endo/Other    Diabetes: well controlled, type 2    Morbid obesity     Other Findings              Physical Exam    Airway  Mallampati: II  TM Distance: > 6 cm  Neck ROM: normal range of motion   Mouth opening: Normal     Cardiovascular  Regular rate and rhythm,  S1 and S2 normal,  no murmur, click, rub, or gallop             Dental  No notable dental hx       Pulmonary  Breath sounds clear to auscultation               Abdominal  GI exam deferred       Other Findings            Anesthetic Plan    ASA: 3  Anesthesia type: MAC            Anesthetic plan and risks discussed with: Patient

## 2022-05-18 NOTE — PROGRESS NOTES

## 2022-05-18 NOTE — PROGRESS NOTES
[address] May 18, 2022       RE: Zell Eisenmenger      To Whom It May Concern,    This is to certify that Zell Eisenmenger may may return to school/work on 5/19/2022. Pt was seen at hospital on 5/18 for a procedure. Please feel free to contact my office if you have any questions or concerns. Thank you for your assistance in this matter.       Sincerely,  MADDIE Drummond MD       5/18/2022  8:13 AM

## 2022-05-18 NOTE — ANESTHESIA POSTPROCEDURE EVALUATION
Post-Anesthesia Evaluation and Assessment    Patient: Nohemi Villalba MRN: [id number]  SSN: [id number]    YOB: 1980  Age: 39 y.o. Sex: female      I have evaluated the patient and they are stable and ready for discharge from the PACU. Cardiovascular Function/Vital Signs  Visit Vitals  /81   Pulse 88   Temp 36.9 °C (98.4 °F)   Resp 22   Ht 5' 7\" (1.702 m)   Wt 133.5 kg (294 lb 4.8 oz)   SpO2 98%   Breastfeeding No   BMI 46.09 kg/m²       Patient is status post MAC anesthesia for Procedure(s):  ESOPHAGOGASTRODUODENOSCOPY (EGD). Nausea/Vomiting: None    Postoperative hydration reviewed and adequate. Pain:  Pain Scale 1: Numeric (0 - 10) (05/18/22 0815)  Pain Intensity 1: 0 (05/18/22 0815)   Managed    Neurological Status: At baseline    Mental Status, Level of Consciousness: Alert and  oriented to person, place, and time    Pulmonary Status:   O2 Device: None (Room air) (05/18/22 0815)   Adequate oxygenation and airway patent    Complications related to anesthesia: None    Post-anesthesia assessment completed. No concerns    Signed By: Radha Kumari MD     May 18, 2022              Procedure(s):  ESOPHAGOGASTRODUODENOSCOPY (EGD). MAC    <BSHSIANPOST>    INITIAL Post-op Vital signs:   Vitals Value Taken Time   /81 05/18/22 0830   Temp 36.9 °C (98.4 °F) 05/18/22 0815   Pulse 85 05/18/22 0831   Resp 22 05/18/22 0831   SpO2 91 % 05/18/22 0831   Vitals shown include unvalidated device data.

## 2022-05-18 NOTE — PROGRESS NOTES
Glo Matter  1980  [id number]    Situation:  Verbal report received from: Indy Murphy RN  Procedure: Procedure(s):  ESOPHAGOGASTRODUODENOSCOPY (EGD)    Background:    Preoperative diagnosis: Morbid obesity ([facility] [E66.01]  BMI 45.0-49.9, adult ([facility] [Z68.42]  Gastroesophageal reflux disease, unspecified whether esophagitis present [K21.9]  Postoperative diagnosis: 1.  Hiatal hernia    :  Dr. Breann Berger  Assistant(s): Endoscopy Technician-1: Cynthia Pastor  Endoscopy RN-1: Melvi Michele RN    Specimens: * No specimens in log *  H. Pylori  no    Assessment:  Intra-procedure medications   Anesthesia gave intra-procedure sedation and medications, see anesthesia flow sheet yes    Intravenous fluids: NS@ KVO     Vital signs stable     Abdominal assessment: round and soft     Recommendation:  Discharge patient per MD order  Family or Friend - kuldip- Briana  Permission to share finding with family or friend

## 2022-05-18 NOTE — H&P
[address], [facility] [address]      History and Physical       NAME:  Zeny Hidalgo   :   1980   MRN:   [id number]             History of Present Illness:  Patient is a 39 y.o. who is seen for epigastric pain. PMH:  Past Medical History:   Diagnosis Date    Anemia     Chlamydia     hx previous pregnancy    Depression      hx  anxiety    Disease of blood and blood forming organ     Gestational diabetes     Taking metformin 500mg BID    Hypertension     Ill-defined condition     obesity       PSH:  Past Surgical History:   Procedure Laterality Date    HX GI      sliding hernia/EGD    HX GYN      \"takes     HX OTHER SURGICAL  2016    UNRULY       Allergies:  No Known Allergies    Home Medications:  Prior to Admission Medications   Prescriptions Last Dose Informant Patient Reported? Taking?   dulaglutide (Trulicity) 1.5 KR/6.9 mL sub-q pen 2022  Yes No   Si.5 mg by SubCUTAneous route every seven (7) days. loratadine (CLARITIN) 10 mg tablet 2022 at Unknown time  Yes Yes   Sig: Take 10 mg by mouth as needed for Allergies. valsartan (DIOVAN) 160 mg tablet 2022 at Unknown time  Yes Yes   Sig: Take 160 mg by mouth daily.       Facility-Administered Medications: None       Hospital Medications:  Current Facility-Administered Medications   Medication Dose Route Frequency    0.9% sodium chloride infusion  50 mL/hr IntraVENous CONTINUOUS    sodium chloride (NS) flush 5-40 mL  5-40 mL IntraVENous Q8H    sodium chloride (NS) flush 5-40 mL  5-40 mL IntraVENous PRN    midazolam (VERSED) injection 0.25-5 mg  0.25-5 mg IntraVENous Multiple    fentaNYL citrate (PF) injection  mcg   mcg IntraVENous Multiple    naloxone (NARCAN) injection 0.4 mg  0.4 mg IntraVENous Multiple    flumazeniL (ROMAZICON) 0.1 mg/mL injection 0.2 mg  0.2 mg IntraVENous Multiple    simethicone (MYLICON) 84TZ/8.7KV oral drops 80 mg  1.2 mL Oral Multiple    atropine injection 0.5 mg  0.5 mg IntraVENous ONCE PRN    EPINEPHrine (ADRENALIN) 0.1 mg/mL syringe 1 mg  1 mg Endoscopically ONCE PRN       Social History:  Social History     Tobacco Use    Smoking status: Former Smoker     Packs/day: 0.25     Quit date: 2019     Years since quittin.5    Smokeless tobacco: Never Used    Tobacco comment: black and mild once in awhile- NO CIGARETTES/quit 22   Substance Use Topics    Alcohol use: Yes     Comment: social       Family History:  Family History   Problem Relation Age of Onset    Diabetes Mother     Hypertension Mother     Diabetes Father     Hypertension Father     Cancer Maternal Aunt         leukemia    Heart Disease Maternal Grandmother     Cancer Maternal Grandfather         brain         The patient was counseled at length about the risks of koffi Covid-19 in the germain-operative and post-operative states including the recovery window of their procedure. The patient was made aware that koffi Covid-19 after a surgical procedure may worsen their prognosis for recovering from the virus and lend to a higher morbidity and or mortality risk. The patient was given the options of postponing their procedure. All of the risks, benefits, and alternatives were discussed. The patient does  wish to proceed with the procedure.     Review of Systems:      Constitutional: negative fever, negative chills, negative weight loss  Eyes:   negative visual changes  ENT:   negative sore throat, tongue or lip swelling  Respiratory:  negative cough, negative dyspnea  Cards:  negative for chest pain, palpitations, lower extremity edema  GI:   See HPI  :  negative for frequency, dysuria  Integument:  negative for rash and pruritus  Heme:  negative for easy bruising and gum/nose bleeding  Musculoskel: negative for myalgias,  back pain and muscle weakness  Neuro: negative for headaches, dizziness, vertigo  Psych:  negative for feelings of anxiety, depression       Objective:     Patient Vitals for the past 8 hrs:   BP Temp Pulse Resp SpO2 Height Weight   05/18/22 0730 124/77 97.8 °F (36.6 °C) 80 20 99 % 5' 7\" (1.702 m) 133.5 kg (294 lb 4.8 oz)     No intake/output data recorded. No intake/output data recorded. EXAM:     NEURO-a&o   HEENT-wnl   LUNGS-clear    COR-regular rate and rhythym     ABD-soft , no tenderness, no rebound, good bs     EXT-no edema     Data Review     No results for input(s): WBC, HGB, HCT, PLT, HGBEXT, HCTEXT, PLTEXT in the last 72 hours. No results for input(s): NA, K, CL, CO2, BUN, CREA, GLU, PHOS, CA in the last 72 hours. No results for input(s): AP, TBIL, TP, ALB, GLOB, GGT, AML, LPSE in the last 72 hours. No lab exists for component: SGOT, GPT, AMYP, HLPSE  No results for input(s): INR, PTP, APTT, INREXT in the last 72 hours. Assessment:     · Epigastric pain  · heartburn     Patient Active Problem List   Diagnosis Code    Obesity, morbid ([facility] E66.01    Anxiety and depression F41.9, F32. A    Prediabetes R73.03    Vitamin D deficiency E55.9    Elevated LDL cholesterol level E78.00    39 weeks gestation of pregnancy Z3A.39    Abdominal pain affecting pregnancy O26.899, R10.9           Plan:   ·   · Endoscopic procedure with sedation     Signed By: Micheal Cotto MD     5/18/2022  7:56 AM

## 2022-05-18 NOTE — DISCHARGE INSTRUCTIONS
[address] [facility]  [address] [facility], [facility] [address]     EGD DISCHARGE INSTRUCTIONS    Zell Eisenmenger  [id number]  1980    Discomfort:  Sore throat- throat lozenges or warm salt water gargle  redness at IV site- apply warm compress to area; if redness or soreness persist- contact your physician  Gaseous discomfort- walking, belching will help relieve any discomfort  You may not operate a vehicle for 12 hours  You may not engage in an occupation involving machinery or appliances for rest of today  You may not drink alcoholic beverages for at least 12 hours  Avoid making any critical decisions for at least 24 hour  DIET  You may resume your regular diet - however -  remember your colon is empty and a heavy meal will produce gas. Avoid these foods:  vegetables, fried / greasy foods, carbonated drinks    ACTIVITY  You may resume your normal daily activities   Spend the remainder of the day resting -  avoid any strenuous activity. CALL M.D. ANY SIGN OF   Increasing pain, nausea, vomiting  Abdominal distension (swelling)  New increased bleeding (oral or rectal)  Fever (chills)  Pain in chest area  Bloody discharge from nose or mouth  Shortness of breath    Follow-up Instructions:   Call Dr. Delvin Mitchell for any questions or problems. Telephone # [phone]  Follow up with your surgeon, Dr Sabra Nichols:   Your endoscopy showed only small hiatal hernia, rest of exam was normal.    Signed By: Delvin Mitchell MD     5/18/2022  8:15 AM         Learning About Coronavirus (COVID-19)  Coronavirus (COVID-19): Overview  What is coronavirus ([id number])? The coronavirus disease (COVID-19) is caused by a virus. It is an illness that was first found in [place], [place], in December 2019. It has since spread worldwide. The virus can cause fever, cough, and trouble breathing. In severe cases, it can cause pneumonia and make it hard to breathe without help.  It can cause death.  Coronaviruses are a large group of viruses. They cause the common cold. They also cause more serious illnesses like Middle East respiratory syndrome (MERS) and severe acute respiratory syndrome (SARS). COVID-19 is caused by a novel coronavirus. That means it's a new type that has not been seen in people before. This virus spreads person-to-person through droplets from coughing and sneezing. It can also spread when you are close to someone who is infected. And it can spread when you touch something that has the virus on it, such as a doorknob or a tabletop. What can you do to protect yourself from coronavirus (COVID-19)? The best way to protect yourself from getting sick is to:  · Avoid areas where there is an outbreak. · Avoid contact with people who may be infected. · Wash your hands often with soap or alcohol-based hand sanitizers. · Avoid crowds and try to stay at least 6 feet away from other people. · Wash your hands often, especially after you cough or sneeze. Use soap and water, and scrub for at least 20 seconds. If soap and water aren't available, use an alcohol-based hand . · Avoid touching your mouth, nose, and eyes. What can you do to avoid spreading the virus to others? To help avoid spreading the virus to others:  · Cover your mouth with a tissue when you cough or sneeze. Then throw the tissue in the trash. · Use a disinfectant to clean things that you touch often. · Stay home if you are sick or have been exposed to the virus. Don't go to school, work, or public areas. And don't use public transportation. · If you are sick:  ? Leave your home only if you need to get medical care. But call the doctor's office first so they know you're coming. And wear a face mask, if you have one.  ? If you have a face mask, wear it whenever you're around other people. It can help stop the spread of the virus when you cough or sneeze. ? Clean and disinfect your home every day.  Use household  and disinfectant wipes or sprays. Take special care to clean things that you grab with your hands. These include doorknobs, remote controls, phones, and handles on your refrigerator and microwave. And don't forget countertops, tabletops, bathrooms, and computer keyboards. When to call for help  Call 911 anytime you think you may need emergency care. For example, call if:  · You have severe trouble breathing. (You can't talk at all.)  · You have constant chest pain or pressure. · You are severely dizzy or lightheaded. · You are confused or can't think clearly. · Your face and lips have a blue color. · You pass out (lose consciousness) or are very hard to wake up. Call your doctor now if you develop symptoms such as:  · Shortness of breath. · Fever. · Cough. If you need to get care, call ahead to the doctor's office for instructions before you go. Make sure you wear a face mask, if you have one, to prevent exposing other people to the virus. Where can you get the latest information? The following health organizations are tracking and studying this virus. Their websites contain the most up-to-date information. Florecita Bowersradha also learn what to do if you think you may have been exposed to the virus. · U.S. Centers for Disease Control and Prevention (CDC): The CDC provides updated news about the disease and travel advice. The website also tells you how to prevent the spread of infection. www.cdc.gov  · World Health Organization [facility]): WHO offers information about the virus outbreaks. WHO also has travel advice. www.who.int  Current as of: April 1, 2020               Content Version: 12.4  © 0604-4088 [employer]. Care instructions adapted under license by your healthcare professional. If you have questions about a medical condition or this instruction, always ask your healthcare professional. [address] any warranty or liability for your use of this information.

## 2022-05-31 ENCOUNTER — OFFICE VISIT (OUTPATIENT)
Dept: SURGERY | Age: 42
End: 2022-05-31

## 2022-05-31 DIAGNOSIS — E66.01 MORBID OBESITY (HCC): Primary | ICD-10-CM

## 2022-06-07 NOTE — PROGRESS NOTES
[facility] at [facility]  Supervised Weight Loss     Date:   2022    Patient's Name: Johanna Miguel  : 1980    Insurance:  [employer]         Session: 2 of  6  Surgery: Sleeve gastrectmy  Surgeon:  Dr. Kayleigh Horton    Height: 67 inches Weight: 294   Lbs (office visit). BMI: 46   Pounds Lost since last month: 11 lbs               Pounds Gained since last month: 0    Starting Weight: 305 lbs   Previous Months Weight:305 lbs  Overall Pounds Lost: 11 lbs Overall Pounds Gained: 0    Other Pertinent Information: Today's appointment was completed in a virtual setting d/t COVID-19. Surgeon recommending 10 lb wt loss before surgery. Smoking Status:  Not reported  Alcohol Intake: not reported    I have reviewed with pt the guidelines of the supervised wt loss program.  Pt understands the expectations of some wt loss during the program and that wt gain could delay the process. I have also explained that appointments need to be consecutive and missing an appointment may result in starting over. Pt has received this information in writing. Changes that patient has made since last month include: Pt did not complete the diet and lifestyle questionnaire despite reminders, therefore, no lifestyle changes were reported. .      Eating Habits and Behaviors  A nutrition lesson was presented on label reading with specific guidelines provided for limiting added sugars. This information will help increase healthy food choices, promote weight loss and prevent dumping syndrome after gastric bypass. We also reviewed the general nutrition guidelines for bariatric surgery.                         Patient's current diet habits include: Pt did not complete the diet and lifestyle questionnaire for today's appointment, therefore, current eating habits were not reported    Physical Activity/Exercise  We talked about the importance of increasing daily physical activity and beginning to develop an exercise regimen/routine. We talked about exercise as being an important part of long term weight loss after surgery. Comments:  During class, I discussed with patient the importance of getting into an exercise routine. Behavior Modification       We talked about how to eat more mindfully. Tips and recommendations for how to make these changes were provided. Pt was encouraged to keep a food journal and record what they were taking in daily. Overall Assessment: Although pt attended class, pt did not complete the required paperwork for today's session, therefore, unable to assess if pt is making appropriate lifestyle changes in preparation for wt loss surgery. Will continue to assess. Patient-Set Goals:   1. Nutrition - None reported  2. Exercise - None reported  3.  Behavior - None reported    Jorden Osgood, KARSON  5/31/2022

## 2022-06-28 ENCOUNTER — OFFICE VISIT (OUTPATIENT)
Dept: SURGERY | Age: 42
End: 2022-06-28

## 2022-06-28 DIAGNOSIS — E66.01 MORBID OBESITY (HCC): Primary | ICD-10-CM

## 2022-06-29 NOTE — PROGRESS NOTES
[facility] at [facility]  Supervised Weight Loss     Date:   2022    Patient's Name: Myles Atkinson  : 1980       Insurance:  [employer]         Session: 3 of  6  Surgery: Sleeve gastrectmy                        Surgeon:  Dr. Kalen Savage     Height: 67 inches       Weight: 294   Lbs (May wt). BMI: 46             Pounds Lost since last month: 0               Pounds Gained since last month: 0     Starting Weight: 305 lbs                   Previous Months Weight: 294 lbs  Overall Pounds Lost: 11 lbs Overall Pounds Gained: 0     Other Pertinent Information: Today's appointment was completed in a virtual setting d/t COVID-19. Surgeon recommending 10 lb wt loss before surgery.      Smoking Status:  Not reported  Alcohol Intake: not reported    I have reviewed with pt the guidelines of the supervised wt loss program.  Pt understands the expectations of some wt loss during the program and that wt gain could delay the process. I have also explained that classes need to be consecutive. Missing a class may result in starting over. Pt has received this information in writing. Changes that patient has made since last month include:  Pt did not complete the diet and lifestyle questionnaire despite reminders, therefore, no lifestyle changes were reported. Eating Habits and Behaviors  General healthy eating guidelines were discussed. A nutrition lesson specific to the importance of protein intake after surgery was provided. We discussed food sources of protein, protein supplements and multiple reasons as to why protein is important after bariatric surgery. Pts were instructed to focus on including protein at every meal and practice eating protein first at the meal. Pts were encouraged to sample a protein shake for tolerance.  Patients were also instructed to use the balanced plate method for help with portion control and general healthy eating prior to surgery. We discussed measuring meals to 1/2 cup total per meal after surgery. Drinking only calorie-free, sugar-free and non-carbonated beverages. We discussed the importance of drinking 64 ounces of fluid per day to prevent dehydration post-operatively. Patient's current diet habits include:  Pt did not complete the diet and lifestyle questionnaire for today's appointment, therefore, current eating habits were not reported        Physical Activity/Exercise  We talked about the importance of increasing daily physical activity and beginning to develop an exercise regimen/routine. We talked about exercise as being an important part of long term weight loss after surgery. Comments:  During class, I discussed with patient the importance of getting into an exercise routine. Behavior Modification       We talked about how to eat more mindfully. Tips and recommendations for how to make these changes were provided. Pt was encouraged to keep a food journal and record what they were taking in daily. Overall Assessment: Although pt attended class, pt did not complete the required paperwork for today's session, therefore, unable to assess if pt is making appropriate lifestyle changes in preparation for wt loss surgery. Will continue to assess.     Patient-Set Goals:   1. Nutrition - None reported  2. Exercise - None reported  3.  Behavior - None reported    Anselmo Valentin, KARSON  6/28/2022

## 2022-07-28 ENCOUNTER — OFFICE VISIT (OUTPATIENT)
Dept: SURGERY | Age: 42
End: 2022-07-28

## 2022-07-28 DIAGNOSIS — E66.01 MORBID OBESITY (HCC): Primary | ICD-10-CM

## 2022-07-28 NOTE — PROGRESS NOTES
[facility] at [facility]  Supervised Weight Loss     Date:   2022    Patient's Name: Salomón Lara  : 1980    Insurance:  [employer]         Session: 4 of  6  Surgery: Sleeve gastrectmy                        Surgeon:  Dr. Honey Parham     Height: 67 inches       Weight: 289 lbs (pt reported)                               BMI: 45            Pounds Lost since last month: 5 lbs               Pounds Gained since last month: 0     Starting Weight: 305 lbs                   Previous Months Weight: 294 lbs  Overall Pounds Lost: 16 lbs Overall Pounds Gained: 0     Other Pertinent Information: Today's appointment was completed in a virtual setting d/t COVID-19. Surgeon recommending 10 lb wt loss before surgery. Smoking Status:  None  Alcohol Intake: not reported    I have reviewed with pt the guidelines of the supervised wt loss program.  Pt understands the expectations of some wt loss during the program and that wt gain could delay the process. I have also explained that appointments need to be consecutive and missing an appointment may result in starting over. Pt has received this information in writing. Changes that patient has made since last month include:  none reported. Eating Habits and Behaviors  A nutrition lesson specific to vitamins was provided. We discussed the various reasons for needing vitamins and different types and doses. General healthy eating guidelines were also discussed. Pts were instructed that their plate should be made up 1/2 plate coming from non-starchy vegetables, 1/4 coming from lean meat, and 1/4 of their plate coming from carbohydrates, including fruits, starches, or milk. We discussed measuring meals to 1/2 cup total per meal after surgery. Drinking only calorie-free, sugar-free and non-carbonated beverages. We discussed the importance of drinking 64 ounces of fluid per day to prevent dehydration post-operatively. Patient's current diet habits include: Pt is eating 1-2 meals per day. Snack choices include chips, yogurt. Pt is eating refined carbohydrate foods (bread, pasta, rice, potatoes) occasionally. Pt is not eating sweets/desserts. Pt is using baking, grilling broiling and frying cooking methods. Pt is eating meals prepared outside of the home occasionally. Pt is drinking water, crystal lite, fruit smoothies. Pt reports no emotional eating. Physical Activity/Exercise  We talked about the importance of increasing daily physical activity and beginning to develop an exercise regimen/routine. We talked about exercise as being an important part of long term weight loss after surgery. Comments:  During class, I discussed with patient the importance of getting into an exercise routine. Pt is currently not exercising. Pt has been encouraged to start a consistent exercise routine. Behavior Modification       We talked about how to eat more mindfully and identify emotional eating triggers. Tips and recommendations for how to make these changes were provided. Pt was encouraged to keep a food journal and record what they were taking in daily. Overall Assessment: Nutrition evaluation reveals important lifestyle changes are indicated. Goals set and recommendations made. Will continue to assess. Patient-Set Goals:   1. Nutrition - eat healthy  2. Exercise - put in more workouts  3.  Behavior - no goal set    Duy Fernández, KARSON  7/28/2022

## 2022-08-30 ENCOUNTER — OFFICE VISIT (OUTPATIENT)
Dept: SURGERY | Age: 42
End: 2022-08-30

## 2022-08-30 DIAGNOSIS — E66.01 MORBID OBESITY (HCC): Primary | ICD-10-CM

## 2022-09-01 NOTE — PROGRESS NOTES
[facility] at [facility]  Supervised Weight Loss     Date:   2022    Patient's Name: Naif Almanza  : 1980    Insurance:  [employer]         Session: 5 of  6  Surgery: Sleeve gastrectmy                        Surgeon:  Dr. Marleny Trivedi     Height: 67 inches       Weight: 286 lbs (pt reported)                               BMI: 44            Pounds Lost since last month: 3 lbs               Pounds Gained since last month: 0     Starting Weight: 305 lbs                   Previous Months Weight: 289 lbs  Overall Pounds Lost: 19 lbs Overall Pounds Gained: 0     Other Pertinent Information: Today's appointment was completed in a virtual setting d/t COVID-19. Surgeon recommending 10 lb wt loss before surgery. Smoking Status:  None  Alcohol Intake: None    I have reviewed with pt the guidelines of the supervised wt loss program.  Pt understands the expectations of some wt loss during the program and that wt gain could delay the process. I have also explained that appointments need to be consecutive and missing an appointment may result in starting over. Pt has received this information in writing. Changes that patient has made since last month include:  cut out fast food. Eating Habits and Behaviors  General healthy eating guidelines were also discussed. Pts were instructed that their plate should be made up 1/2 plate coming from non-starchy vegetables, 1/4 coming from lean meat, and 1/4 of their plate coming from carbohydrates, including fruits, starches, or milk. We discussed measuring meals to 1/2 cup total per meal after surgery. Drinking only calorie-free, sugar-free and non-carbonated beverages. We discussed the importance of drinking 64 ounces of fluid per day to prevent dehydration post-operatively.                  Physical Activity/Exercise  We talked about the importance of increasing daily physical activity and beginning to develop an exercise regimen/routine. We talked about exercise as being an important part of long term weight loss after surgery. Comments:  During class, I discussed with patient the importance of getting into an exercise routine. Pt is currently not exercising. Pt has been encouraged to start a consistent exercise program.       Behavior Modification    A behavior modification lesson was provided with an emphasis on developing mindful eating behaviors. We talked about how to eat more mindfully and identify emotional eating triggers. Tips and recommendations for how to make these changes were provided. Pt was encouraged to keep a food journal and record what they were taking in daily. Patient's reported eating behaviors: no emotional eating, packing meals when away from home, eating meals in front of TV. Biggest trigger when managing weight is food choices and portion sizes. Overall Assessment: Nutrition evaluation reveals important small lifestyle changes are being made along with wt loss. Goals set and recommendations made. Will continue to assess. Patient-Set Goals:   1. Nutrition - stop eating junk food  2. Exercise - work out a couple of days a week  3.  Behavior - live healthy life, stick to plan; be determined    Sahara Mccurdy RD  8/30/2022

## 2022-09-28 ENCOUNTER — OFFICE VISIT (OUTPATIENT)
Dept: SURGERY | Age: 42
End: 2022-09-28

## 2022-09-28 ENCOUNTER — TELEPHONE (OUTPATIENT)
Dept: SURGERY | Age: 42
End: 2022-09-28

## 2022-09-28 DIAGNOSIS — E66.01 MORBID OBESITY (HCC): Primary | ICD-10-CM

## 2022-10-01 ENCOUNTER — OFFICE VISIT (OUTPATIENT)
Dept: URGENT CARE | Age: 42
End: 2022-10-01
Payer: MEDICAID

## 2022-10-01 VITALS
HEIGHT: 67 IN | SYSTOLIC BLOOD PRESSURE: 158 MMHG | WEIGHT: 280 LBS | DIASTOLIC BLOOD PRESSURE: 115 MMHG | BODY MASS INDEX: 43.95 KG/M2 | OXYGEN SATURATION: 99 % | TEMPERATURE: 98.7 F | RESPIRATION RATE: 18 BRPM | HEART RATE: 80 BPM

## 2022-10-01 DIAGNOSIS — K04.7 DENTAL ABSCESS: Primary | ICD-10-CM

## 2022-10-01 PROCEDURE — 99202 OFFICE O/P NEW SF 15 MIN: CPT | Performed by: INTERNAL MEDICINE

## 2022-10-01 RX ORDER — AMOXICILLIN 500 MG/1
500 CAPSULE ORAL 2 TIMES DAILY
Qty: 20 CAPSULE | Refills: 0 | Status: SHIPPED | OUTPATIENT
Start: 2022-10-01

## 2022-10-01 NOTE — PROGRESS NOTES
HISTORY OF PRESENT ILLNESS  Josemanuel Polanco is a 43 y.o. female. Patient was seen after she began to have a abscess in her mouth. Knows that she needs to have some dental work. Also reports a HA, sinus pressure and congestion for 3 days. No fever or cough. Visit Vitals  BP (!) 158/115   Pulse 80   Temp 98.7 °F (37.1 °C)   Resp 18   Ht 5' 7\" (1.702 m)   Wt 280 lb (127 kg)   LMP 09/26/2022   SpO2 99%   BMI 43.85 kg/m²     Past Medical History:   Diagnosis Date    Anemia     Chlamydia     hx previous pregnancy    Depression      hx  anxiety    Disease of blood and blood forming organ     Gestational diabetes     Taking metformin 500mg BID    Hypertension     Ill-defined condition     obesity     Past Surgical History:   Procedure Laterality Date    HX GI      sliding hernia/EGD    HX GYN      \"takes     HX OTHER SURGICAL  2016    UNRULY     Family History   Problem Relation Age of Onset    Diabetes Mother     Hypertension Mother     Diabetes Father     Hypertension Father     Cancer Maternal Aunt         leukemia    Heart Disease Maternal Grandmother     Cancer Maternal Grandfather         brain     Outpatient Encounter Medications as of 10/1/2022   Medication Sig Dispense Refill    amoxicillin (AMOXIL) 500 mg capsule Take 1 Capsule by mouth two (2) times a day. 20 Capsule 0    dulaglutide (Trulicity) 1.5 KA/5.5 mL sub-q pen 1.5 mg by SubCUTAneous route every seven (7) days. loratadine (CLARITIN) 10 mg tablet Take 10 mg by mouth as needed for Allergies. valsartan (DIOVAN) 160 mg tablet Take 160 mg by mouth daily. No facility-administered encounter medications on file as of 10/1/2022. HPI    Review of Systems   Constitutional: Negative. HENT:  Positive for congestion and sinus pain. Respiratory: Negative. Cardiovascular: Negative. Gastrointestinal: Negative. Neurological:  Positive for headaches. Physical Exam  Vitals and nursing note reviewed.    HENT:      Nose: Congestion present. Right Sinus: Maxillary sinus tenderness present. Left Sinus: Maxillary sinus tenderness present. Mouth/Throat:      Dentition: Gingival swelling and dental abscesses present. Cardiovascular:      Rate and Rhythm: Normal rate and regular rhythm. Pulmonary:      Effort: Pulmonary effort is normal.      Breath sounds: Normal breath sounds. Skin:     General: Skin is warm. Neurological:      Mental Status: She is alert and oriented to person, place, and time. Psychiatric:         Behavior: Behavior normal.       ASSESSMENT and PLAN  Diagnoses and all orders for this visit:    1. Dental abscess  -     amoxicillin (AMOXIL) 500 mg capsule; Take 1 Capsule by mouth two (2) times a day.         reviewed medications and side effects in detail

## 2022-10-05 NOTE — PROGRESS NOTES
[facility] at [facility]  Supervised Weight Loss     Date:  2022   Patient's Name: Elfego Saldana  : 1980    Insurance:  [employer]           Session: 6 of  6  Surgery: Sleeve gastrectmy                        Surgeon:  Dr. Estrada Manzo     Height: 67\"       Reported Weight: 286 #                               BMI: 44            Pounds Lost since last month: 0              Pounds Gained since last month: 0     Starting Weight: 305#                       Previous Months Weight: 286#  Overall Pounds Lost: 19 #   Overall Pounds Gained: 0    Other Pertinent Information: Today's appointment was completed in a virtual setting d/t COVID-19. Today's wt was self-reported. Smoking Status:  none  Alcohol Intake: none    I have reviewed with pt the guidelines of the supervised wt loss program.  Pt understands the expectations of some wt loss during the program and that wt gain could delay the process. I have also explained that appointments need to be consecutive and missing an appointment may result in starting over. Pt has received this information in writing. Changes that patient has made since last month include:  smaller portions, eating healthier, staying consistent. Eating Habits and Behaviors  General healthy eating guidelines were discussed. A nutrition lesson was presented on portion control. Patients were instructed implement portion control now using the balanced plate method (1/2 plate non-starchy vegetables, 1/4 plate lean meat, and 1/4 plate whole grains and to include fruit and/or milk at meals or snack). We discussed measuring meals to 1/2 cup total per meal after surgery and appropriate portion progression long term. Patient's current diet habits include: Pt is eating 2-3 meals per day. Snack choices include peanuts, yogurt, fruits, vegetables.  Pt is eating refined carbohydrate foods (bread, pasta, rice, potatoes) 2-3 times per week. Pt is eating sweets/desserts none. Pt is using healthy cooking methods. Pt is eating meals prepared outside of the home once per week. Pt is drinking mostly water. Pt reports no to emotional eating. Physical Activity/Exercise  We talked about the importance of increasing daily physical activity and beginning to develop an exercise regimen/routine. We talked about exercise as being an important part of long term weight loss after surgery. Comments:  During class, I discussed with patient the importance of getting into an exercise routine. Pt is currently not exercising for activity. Pt has been encouraged to implement exercise tolerated. Behavior Modification       We talked about how to eat more mindfully. Tips and recommendations for how to make these changes were provided. Pt was encouraged to keep a food journal and record what they were taking in daily. Overall Assessment: Pt demonstrates appropriate lifestyle changes evidenced by reported changes and reported wt loss. Demonstrates understanding of nutrition guidelines and appears to be an appropriate candidate for surgery at this time. Patient-Set Goals:   1. Nutrition - no goals set   2. Exercise - no goals set   3.  Behavior -no goals set     John Chung, [address]  10/5/2022

## 2024-04-30 ENCOUNTER — TELEPHONE (OUTPATIENT)
Age: 44
End: 2024-04-30

## 2024-04-30 NOTE — TELEPHONE ENCOUNTER
Attempted to call patient to confirm interest in SWL.     Confirm if patient is still interested in SWL will need to restart.

## 2025-02-06 ENCOUNTER — TELEPHONE (OUTPATIENT)
Age: 45
End: 2025-02-06

## (undated) DEVICE — PAD,SANITARY,11 IN,MAXI,N-STRL,IND WRAP: Brand: MEDLINE

## (undated) DEVICE — SPONGE GZ W4XL4IN COT RADPQ HIGHLY ABSRB

## (undated) DEVICE — HANDLE SUCT TBNG L6FR DIA3/8IN SWVL W/ M ADPT FOR BERK PMP

## (undated) DEVICE — TOWEL SURG W17XL27IN STD BLU COT NONFENESTRATED PREWASHED

## (undated) DEVICE — GARMENT,MEDLINE,DVT,INT,CALF,MED, GEN2: Brand: MEDLINE

## (undated) DEVICE — TRAY PREP DRY W/ PREM GLV 2 APPL 6 SPNG 2 UNDPD 1 OVERWRAP

## (undated) DEVICE — INFECTION CONTROL KIT SYS

## (undated) DEVICE — COVER LT HNDL PLAS RIG 1 PER PK

## (undated) DEVICE — STERILE POLYISOPRENE POWDER-FREE SURGICAL GLOVES WITH EMOLLIENT COATING: Brand: PROTEXIS

## (undated) DEVICE — CATHETER,URETHRAL,REDRUBBER,STRL,16FR: Brand: MEDLINE

## (undated) DEVICE — Z INACTIVE USE 2527070 DRAPE SURG W40XL44IN UNDERBUTTOCK SMS POLYPR W/ PCH BK DISP

## (undated) DEVICE — TUBING HYDR IRR --

## (undated) DEVICE — NEEDLE SPNL 22GA L3.5IN BLK HUB S STL REG WALL FIT STYL W/

## (undated) DEVICE — SYR 10ML LUER LOK 1/5ML GRAD --

## (undated) DEVICE — SOL IRR SOD CL 0.9% 1000ML BTL --

## (undated) DEVICE — GOWN,SIRUS,FABRNF,XL,20/CS: Brand: MEDLINE

## (undated) DEVICE — CURETTE VAC DIA9MM PLAS CRV OPN TIP RIG DISP VACURET D/E

## (undated) DEVICE — PACK,LITHOTOMY,PK I: Brand: MEDLINE

## (undated) DEVICE — COLLECTION KT SUC TISS BERK -- GYRUS

## (undated) DEVICE — MARKER,SKIN,WI/RULER AND LABELS: Brand: MEDLINE

## (undated) DEVICE — STRAP,POSITIONING,KNEE/BODY,FOAM,4X60": Brand: MEDLINE